# Patient Record
Sex: MALE | Race: BLACK OR AFRICAN AMERICAN | NOT HISPANIC OR LATINO | Employment: FULL TIME | URBAN - METROPOLITAN AREA
[De-identification: names, ages, dates, MRNs, and addresses within clinical notes are randomized per-mention and may not be internally consistent; named-entity substitution may affect disease eponyms.]

---

## 2018-01-31 ENCOUNTER — TRANSCRIBE ORDERS (OUTPATIENT)
Dept: PHYSICAL THERAPY | Facility: CLINIC | Age: 38
End: 2018-01-31

## 2018-01-31 DIAGNOSIS — S83.91XA SPRAIN OF RIGHT KNEE, UNSPECIFIED LIGAMENT, INITIAL ENCOUNTER: Primary | ICD-10-CM

## 2018-02-02 ENCOUNTER — TREATMENT (OUTPATIENT)
Dept: PHYSICAL THERAPY | Facility: CLINIC | Age: 38
End: 2018-02-02

## 2018-02-02 DIAGNOSIS — M25.561 ACUTE PAIN OF RIGHT KNEE: Primary | ICD-10-CM

## 2018-02-02 DIAGNOSIS — S83.91XA SPRAIN OF RIGHT KNEE, UNSPECIFIED LIGAMENT, INITIAL ENCOUNTER: ICD-10-CM

## 2018-02-02 PROCEDURE — 97110 THERAPEUTIC EXERCISES: CPT | Performed by: PHYSICAL THERAPIST

## 2018-02-02 PROCEDURE — 97530 THERAPEUTIC ACTIVITIES: CPT | Performed by: PHYSICAL THERAPIST

## 2018-02-02 PROCEDURE — 97001 PR PHYS THERAPY EVALUATION: CPT | Performed by: PHYSICAL THERAPIST

## 2018-02-05 ENCOUNTER — OFFICE VISIT (OUTPATIENT)
Dept: PHYSICAL THERAPY | Facility: CLINIC | Age: 38
End: 2018-02-05

## 2018-02-05 DIAGNOSIS — M25.561 ACUTE PAIN OF RIGHT KNEE: Primary | ICD-10-CM

## 2018-02-05 DIAGNOSIS — S83.91XD SPRAIN OF RIGHT KNEE, SUBSEQUENT ENCOUNTER: ICD-10-CM

## 2018-02-05 PROCEDURE — 97530 THERAPEUTIC ACTIVITIES: CPT | Performed by: PHYSICAL THERAPIST

## 2018-02-05 PROCEDURE — 97014 ELECTRIC STIMULATION THERAPY: CPT | Performed by: PHYSICAL THERAPIST

## 2018-02-05 PROCEDURE — 97110 THERAPEUTIC EXERCISES: CPT | Performed by: PHYSICAL THERAPIST

## 2018-02-05 NOTE — PROGRESS NOTES
Physical Therapy Daily Progress Note    Time In 1300  Time Out 1350    Rula Bardales reports: right knee feels like it is moving better since starting exercises and using heat/ice.  States that he still can't put full weight through RLE or fully extend knee without increased pain.      Subjective Evaluation    Pain  Current pain ratin  At worst pain ratin           Objective       Observations     Additional Observation Details  Ambulates with noted antalgia right LE with decreased step/stride length as well as decreased weight bearing.    Palpation     Right Tenderness of the distal semitendinosus.     Tenderness     Right Knee   Tenderness in the MCL (distal) and medial joint line.     Active Range of Motion     Right Knee   Flexion: 95 (deg) degrees with pain  Extension: 15 (deg) degrees with pain    Additional Active Range of Motion Details  Extension deficit noted as patient reports increased right knee pain with attempted full extension.     See Exercise, Manual, and Modality Logs for complete treatment.   Added: SAQ, Supine SLR with quad set, standing weightshift(side to side), seated heel/toe raises  Pain free performance of therapeutic exercise regimen.  Discussed return to using assistive device as appropriate. Ie. One crutch ambulation   Discussed continued appropriate use of knee sleeve.  Continued use of home heat/ice as appropriate.    Assessment/Plan  Presents with noted antalgia right LE with decreased step/stride length as well as weight bearing through R LE.  Palpable tenderness elicited right medial joint line, mcl and posterior hamstring(distal semitendinous).  Able to progress therapeutic exercises to include basic quad strengthening but remains limited with exercise/activity due to right knee pain.  AROM right knee flexion increased vs initial measurement, but extension deficits remain prominent.    Other  See one more time prior to MD follow up. Check goals, ROM, etc next  session.           Manual Therapy:       mins  33865;  Therapeutic Exercise:    8     mins  43029;     Neuromuscular Zoe:       mins  44539;    Therapeutic Activity:  20      mins  35784;     Gait Training:          mins  94586;     Ultrasound:        mins  99853;    Electrical Stimulation:   20    mins  06376 ( );      Timed Treatment:  28    mins   Total Treatment:    50   mins    Jimbo Story PTA    Physical Therapist Assistant [place] [id number]

## 2018-02-07 ENCOUNTER — OFFICE VISIT (OUTPATIENT)
Dept: PHYSICAL THERAPY | Facility: CLINIC | Age: 38
End: 2018-02-07

## 2018-02-07 DIAGNOSIS — M25.561 ACUTE PAIN OF RIGHT KNEE: Primary | ICD-10-CM

## 2018-02-07 DIAGNOSIS — S83.91XD SPRAIN OF RIGHT KNEE, SUBSEQUENT ENCOUNTER: ICD-10-CM

## 2018-02-07 PROCEDURE — 97014 ELECTRIC STIMULATION THERAPY: CPT | Performed by: PHYSICAL THERAPIST

## 2018-02-07 PROCEDURE — 97530 THERAPEUTIC ACTIVITIES: CPT | Performed by: PHYSICAL THERAPIST

## 2018-02-07 PROCEDURE — 97110 THERAPEUTIC EXERCISES: CPT | Performed by: PHYSICAL THERAPIST

## 2018-02-07 NOTE — PROGRESS NOTES
"Physical Therapy Progress Note    Time In 1300  Time Out 1405      2018  Farzad Goodrich PA-c    Re: Rula Bardales  ________________________________________________________________    Mr. Rula Bardales, has attended 3/3 PT sessions to his right knee.  Treatment has consisted of: therapeutic modalities, limited therapeutic exercise instruction/performance and patient education.     S: Mr. Rula Bardales states: right knee pain is increased today, unsure why maybe slept wrong on it last night, as it felt fine last night when doing exercises.  Reports that he felt that he could move his knee a little better(bending knee) after the first couple sessions of PT but that it was and is still bothersome to try and fully extend right knee(lock it out straight).  Has experienced some \"popping\" in knee with certain movements and that he continues to have constant \"throbbing\" in his knee.    Subjective Evaluation    Pain  Current pain ratin  At best pain ratin  At worst pain ratin  Quality: throbbing (popping)  Relieving factors: change in position and ice  Aggravating factors: ambulation, prolonged positioning and sleeping           Objective       Observations     Additional Observation Details  Presents with noted RLE antalgia with decreased weightbearing(~15-20% of full), step and stride length.  Noted quick step/hop with ambulation.    Palpation     Right Tenderness of the distal semitendinosus.     Tenderness     Right Knee   Tenderness in the MCL (distal), MCL (proximal) and medial joint line.     Active Range of Motion     Right Knee   Flexion: 85 degrees with pain  Extension: 10 (10 deg from 0) degrees   Extensor lag: 10 (10 deg from 0) degrees     Additional Active Range of Motion Details  AROM right knee decreased by 10 deg vs last measurement 18 which was 95 deg.    Strength/Myotome Testing     Right Knee   Quadriceps contraction: good    Swelling     Left Knee Girth Measurement (cm)   Joint line: " 45 (midpatella 45.4 cm) cm  10 cm above joint line: 50 (50.2 cm suprapatella) cm  10 cm below joint line: 43 (42.5 cm infrapatella) cm    Right Knee Girth Measurement (cm)   Joint line: 48 (47.6cm; mid patella) cm  10 cm above joint line: 50 (suprapatella 49.5cm) cm  10 cm below joint line: 44 (infrapatella 43.5 cm) cm     See Exercise, Manual, and Modality Logs for complete treatment.     Continued use/importance of ice, knee sleeve and assistive device as appropriate.      Assessment/Plan  Compliant/cooperative with short term PT intervention(3 times over last week).  Subjectively, he noted improved R knee ROM with initial exercise regimen but experiencing increased knee pain today of unknown reason/origin though suspects he slept on it wrong last night.  Objectively, he presents with limited painfree AROM Right knee, mild increase in girth measurement R vs L knee and was found to have some positive special test findings upon initial evaluation that were indicative of potential medial meniscus and MCL involvement.  Guarding today prevents assessment and limits exercise activity/participation due to pain.  Pt may benefit from continued short course of PT, though if symptoms persist he may benefit from other medical management options in the near future.    Other  To Md post Rx with letter. Await further orders.           Manual Therapy:         mins  75374;  Therapeutic Exercise:   10     mins  55773;     Neuromuscular Zoe:        mins  31570;    Therapeutic Activity:      25    mins  61861;     Gait Training:          mins  34776;     Ultrasound:          mins  02832;    Electrical Stimulation:    20    mins  59800 ( );      Timed Treatment:  35    mins   Total Treatment:    65   mins    Jimbo Story PTA,  Physical Therapist Assistant # [id number]

## 2018-04-24 ENCOUNTER — APPOINTMENT (OUTPATIENT)
Dept: PREADMISSION TESTING | Facility: HOSPITAL | Age: 38
End: 2018-04-24

## 2018-04-24 VITALS
BODY MASS INDEX: 45.1 KG/M2 | WEIGHT: 315 LBS | SYSTOLIC BLOOD PRESSURE: 196 MMHG | RESPIRATION RATE: 20 BRPM | DIASTOLIC BLOOD PRESSURE: 109 MMHG | HEIGHT: 70 IN | OXYGEN SATURATION: 96 % | TEMPERATURE: 97.9 F | HEART RATE: 93 BPM

## 2018-04-24 LAB
ANION GAP SERPL CALCULATED.3IONS-SCNC: 12.8 MMOL/L
BUN BLD-MCNC: 16 MG/DL (ref 6–20)
BUN/CREAT SERPL: 13.9 (ref 7–25)
CALCIUM SPEC-SCNC: 9.1 MG/DL (ref 8.6–10.5)
CHLORIDE SERPL-SCNC: 98 MMOL/L (ref 98–107)
CO2 SERPL-SCNC: 28.2 MMOL/L (ref 22–29)
CREAT BLD-MCNC: 1.15 MG/DL (ref 0.76–1.27)
DEPRECATED RDW RBC AUTO: 44.3 FL (ref 37–54)
ERYTHROCYTE [DISTWIDTH] IN BLOOD BY AUTOMATED COUNT: 13.5 % (ref 11.5–14.5)
GFR SERPL CREATININE-BSD FRML MDRD: 86 ML/MIN/1.73
GLUCOSE BLD-MCNC: 132 MG/DL (ref 65–99)
HCT VFR BLD AUTO: 46 % (ref 40.4–52.2)
HGB BLD-MCNC: 15.3 G/DL (ref 13.7–17.6)
MCH RBC QN AUTO: 29.7 PG (ref 27–32.7)
MCHC RBC AUTO-ENTMCNC: 33.3 G/DL (ref 32.6–36.4)
MCV RBC AUTO: 89.3 FL (ref 79.8–96.2)
PLATELET # BLD AUTO: 216 10*3/MM3 (ref 140–500)
PMV BLD AUTO: 10.9 FL (ref 6–12)
POTASSIUM BLD-SCNC: 3.4 MMOL/L (ref 3.5–5.2)
RBC # BLD AUTO: 5.15 10*6/MM3 (ref 4.6–6)
SODIUM BLD-SCNC: 139 MMOL/L (ref 136–145)
WBC NRBC COR # BLD: 7.25 10*3/MM3 (ref 4.5–10.7)

## 2018-04-24 PROCEDURE — 80048 BASIC METABOLIC PNL TOTAL CA: CPT | Performed by: ORTHOPAEDIC SURGERY

## 2018-04-24 PROCEDURE — 93010 ELECTROCARDIOGRAM REPORT: CPT | Performed by: INTERNAL MEDICINE

## 2018-04-24 PROCEDURE — 93005 ELECTROCARDIOGRAM TRACING: CPT

## 2018-04-24 PROCEDURE — 85027 COMPLETE CBC AUTOMATED: CPT | Performed by: ORTHOPAEDIC SURGERY

## 2018-04-24 PROCEDURE — 36415 COLL VENOUS BLD VENIPUNCTURE: CPT

## 2018-04-24 RX ORDER — HYDROCHLOROTHIAZIDE 25 MG/1
25 TABLET ORAL DAILY
COMMUNITY
End: 2019-05-31

## 2018-04-24 RX ORDER — METOPROLOL SUCCINATE 50 MG/1
50 TABLET, EXTENDED RELEASE ORAL DAILY
COMMUNITY
End: 2019-05-31

## 2018-04-24 RX ORDER — AMLODIPINE BESYLATE 10 MG/1
10 TABLET ORAL EVERY EVENING
COMMUNITY
End: 2019-05-31

## 2018-04-24 RX ORDER — ATORVASTATIN CALCIUM 20 MG/1
20 TABLET, FILM COATED ORAL DAILY
COMMUNITY
End: 2019-05-31

## 2018-04-24 NOTE — DISCHARGE INSTRUCTIONS
Take the following medications the morning of surgery with a small sip of water:  METOPROLOL      General Instructions:  • Do not eat solid food after midnight the night before surgery.  • You may drink clear liquids day of surgery but must stop at least one hour before your hospital arrival time.  • It is beneficial for you to have a clear drink that contains carbohydrates the day of surgery.  We suggest a 12 to 20 ounce bottle of Gatorade or Powerade for non-diabetic patients or a 12 to 20 ounce bottle of G2 or Powerade Zero for diabetic patients. (Pediatric patients, are not advised to drink a 12 to 20 ounce carbohydrate drink)    Clear liquids are liquids you can see through.  Nothing red in color.     Plain water                               Sports drinks  Sodas                                   Gelatin (Jell-O)  Fruit juices without pulp such as white grape juice and apple juice  Popsicles that contain no fruit or yogurt  Tea or coffee (no cream or milk added)  Gatorade / Powerade  G2 / Powerade Zero    • Infants may have breast milk up to four hours before surgery.  • Infants drinking formula may drink formula up to six hours before surgery.   • Patients who avoid smoking, chewing tobacco and alcohol for 4 weeks prior to surgery have a reduced risk of post-operative complications.  Quit smoking as many days before surgery as you can.  • Do not smoke, use chewing tobacco or drink alcohol the day of surgery.   • If applicable bring your C-PAP/ BI-PAP machine.  • Bring any papers given to you in the doctor’s office.  • Wear clean comfortable clothes and socks.  • Do not wear contact lenses or make-up.  Bring a case for your glasses.   • Bring crutches or walker if applicable.  • Remove all piercings.  Leave jewelry and any other valuables at home.  • Hair extensions with metal clips must be removed prior to surgery.  • The Pre-Admission Testing nurse will instruct you to bring medications if unable to obtain an  accurate list in Pre-Admission Testing.        If you were given a blood bank ID arm band remember to bring it with you the day of surgery.    Preventing a Surgical Site Infection:  • For 2 to 3 days before surgery, avoid shaving with a razor because the razor can irritate skin and make it easier to develop an infection.  • The night prior to surgery sleep in a clean bed with clean clothing.  Do not allow pets to sleep with you.  • Shower on the morning of surgery using a fresh bar of anti-bacterial soap (such as Dial) and clean washcloth.  Dry with a clean towel and dress in clean clothing.  • Ask your surgeon if you will be receiving antibiotics prior to surgery.  • Make sure you, your family, and all healthcare providers clean their hands with soap and water or an alcohol based hand  before caring for you or your wound.    Day of surgery: 5/2/2018 ARRIVAL TIME 5:30 AM  Upon arrival, a Pre-op nurse and Anesthesiologist will review your health history, obtain vital signs, and answer questions you may have.  The only belongings needed at this time will be your home medications and if applicable your C-PAP/BI-PAP machine.  If you are staying overnight your family can leave the rest of your belongings in the car and bring them to your room later.  A Pre-op nurse will start an IV and you may receive medication in preparation for surgery, including something to help you relax.  Your family will be able to see you in the Pre-op area.  While you are in surgery your family should notify the waiting room  if they leave the waiting room area and provide a contact phone number.    Please be aware that surgery does come with discomfort.  We want to make every effort to control your discomfort so please discuss any uncontrolled symptoms with your nurse.   Your doctor will most likely have prescribed pain medications.      If you are going home after surgery you will receive individualized written care  instructions before being discharged.  A responsible adult must drive you to and from the hospital on the day of your surgery and stay with you for 24 hours.    If you are staying overnight following surgery, you will be transported to your hospital room following the recovery period.  [facility] has all private rooms.    If you have any questions please call Pre-Admission Testing at [phone].  Deductibles and co-payments are collected on the day of service. Please be prepared to pay the required co-pay, deductible or deposit on the day of service as defined by your plan.

## 2018-09-25 ENCOUNTER — TRANSCRIBE ORDERS (OUTPATIENT)
Dept: PHYSICAL THERAPY | Facility: CLINIC | Age: 38
End: 2018-09-25

## 2018-09-25 DIAGNOSIS — M25.561 RIGHT KNEE PAIN, UNSPECIFIED CHRONICITY: Primary | ICD-10-CM

## 2018-10-01 ENCOUNTER — TREATMENT (OUTPATIENT)
Dept: PHYSICAL THERAPY | Facility: CLINIC | Age: 38
End: 2018-10-01

## 2018-10-01 DIAGNOSIS — M25.561 ACUTE PAIN OF RIGHT KNEE: Primary | ICD-10-CM

## 2018-10-01 PROCEDURE — 97530 THERAPEUTIC ACTIVITIES: CPT | Performed by: PHYSICAL THERAPIST

## 2018-10-01 PROCEDURE — 97014 ELECTRIC STIMULATION THERAPY: CPT | Performed by: PHYSICAL THERAPIST

## 2018-10-01 PROCEDURE — 97164 PT RE-EVAL EST PLAN CARE: CPT | Performed by: PHYSICAL THERAPIST

## 2018-10-01 PROCEDURE — 97110 THERAPEUTIC EXERCISES: CPT | Performed by: PHYSICAL THERAPIST

## 2018-10-01 NOTE — PATIENT INSTRUCTIONS
Educated about Dx and exam findings, rationale and POC. Gave handout on HEP with instructions.  Ed on basic joint protection principles and edema control

## 2018-10-01 NOTE — PROGRESS NOTES
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  Immediate onset of (R) medial knee pain.  Had progressive difficulty standing and walking.  Positive for swelling.     Patient to occ med same day.  Told to ice, use crutches as needed, use Neoprene sleeve (not wearing today), and steroid pack.  At follow-up appointment was given Ibuprofen and told to begin PT. PT x 3  - to ortho - MRI. Surgery scheduled and cancelled a couple times with Dr. Estevez so WC sent to Dr. Vital.  Surgery 18 for arthroscopic meniscectomy. RTO 10/24/18.        Patient Occupation: [employer] -    Precautions and Work Restrictions:  off work currentlyQuality of life: good    Pain  Current pain ratin  At best pain ratin  At worst pain rating: 10  Location: (R) medial knee, generalized  Quality: throbbing and sharp (sharp with quick or suddent movements)  Relieving factors: ice and rest  Aggravating factors: standing, ambulation, stairs and squatting (changing directions while walking, bending knee, straightening knee)  Progression: improved    Social Support  Patient lives at: 4 steps inside house.    Hand dominance: right    Diagnostic Tests  MRI studies: abnormal (says torn meniscus)    Treatments  Current treatment: medication and physical therapy  Current treatment comments: work activity modifications/restrictions.     Patient Goals  Patient goals for therapy: decreased pain, return to work and decreased edema             Objective       Observations     Additional Observation Details  Healing portal scars; mild-mod antalgia RLE with supination    Tenderness     Right Knee   Tenderness in the medial joint line.     Additional " Tenderness Details  Medial portal scars    Active Range of Motion   Left Knee   Flexion: WFL    Right Knee   Flexion: 113 degrees   Extensor lag: 3 degrees     Patellar Mobility     Right Knee Patellar tendons within functional limits include the lateral, superior and inferior. Hypomobile in the medial patellar tendon(s).     Strength/Myotome Testing     Left Knee   Flexion: 5  Extension: 5    Right Knee   Flexion: 4  Extension: 4-    Tests     Additional Tests Details  Neg Homans    Swelling     Left Knee Girth Measurement (cm)   Joint line: 49.6.    Right Knee Girth Measurement (cm)   Joint line: 51 cm         Assessment & Plan     Assessment  Impairments: abnormal gait, abnormal or restricted ROM, impaired physical strength and pain with function  Assessment details: 39 yo M presents 1.5 mos s/p menisectomy with limited and painful ROM, weakness, antalgic gait, edema and limited aggie for WB ADLs  Prognosis: good  Functional Limitations: walking, uncomfortable because of pain, standing, stooping and unable to perform repetitive tasks  Goals  Plan Goals: STGs x 2 wks  1. Increasing ROM and strength for improved ADLs  2. Pain with ADLs < 5/10  3. Review body mechanics and joint protection principles with ADLs  4. Ambulates level surface and 4 inch steps with min to no antalgia    LTGs x 4 wks  1. ROM and strength WFL for normal ADLs  2. Min to no tenderness and edema  3. Ambulates 5 min and flight of stairs with normal gait  4. Demonstrates tolerance for squat to lift 50 lbs and push/pull 100 lbs to allow return to work w/o restriction    Plan  Therapy options: will be seen for skilled physical therapy services  Planned modality interventions: cryotherapy and electrical stimulation/Russian stimulation  Planned therapy interventions: manual therapy, stretching, therapeutic activities, strengthening, home exercise program, body mechanics training, balance/weight-bearing training and functional ROM exercises  Duration  in visits: 12  Treatment plan discussed with: patient        Manual Therapy:    0     mins  72084;  Therapeutic Exercise:    18     mins  56746;     Neuromuscular Zoe:    0    mins  48213;    Therapeutic Activity:     8     mins  91308;       Timed Treatment:   26   mins   Total Treatment:     58   mins    PT SIGNATURE: Hoa Savage PT   DATE TREATMENT INITIATED: 10/1/2018    Initial Certification  Certification Period: 12/30/2018  I certify that the therapy services are furnished while this patient is under my care.  The services outlined above are required by this patient, and will be reviewed every 90 days.     PHYSICIAN: Olivia Douglas PA      DATE:     Please sign and return via fax to [phone].. Thank you, [facility].

## 2018-10-03 ENCOUNTER — TREATMENT (OUTPATIENT)
Dept: PHYSICAL THERAPY | Facility: CLINIC | Age: 38
End: 2018-10-03

## 2018-10-03 DIAGNOSIS — S83.91XD SPRAIN OF RIGHT KNEE, SUBSEQUENT ENCOUNTER: ICD-10-CM

## 2018-10-03 DIAGNOSIS — M25.561 ACUTE PAIN OF RIGHT KNEE: Primary | ICD-10-CM

## 2018-10-03 PROCEDURE — 97110 THERAPEUTIC EXERCISES: CPT | Performed by: PHYSICAL THERAPIST

## 2018-10-03 PROCEDURE — 97014 ELECTRIC STIMULATION THERAPY: CPT | Performed by: PHYSICAL THERAPIST

## 2018-10-05 ENCOUNTER — TREATMENT (OUTPATIENT)
Dept: PHYSICAL THERAPY | Facility: CLINIC | Age: 38
End: 2018-10-05

## 2018-10-05 DIAGNOSIS — M25.561 ACUTE PAIN OF RIGHT KNEE: Primary | ICD-10-CM

## 2018-10-05 DIAGNOSIS — S83.91XD SPRAIN OF RIGHT KNEE, SUBSEQUENT ENCOUNTER: ICD-10-CM

## 2018-10-05 PROCEDURE — 97014 ELECTRIC STIMULATION THERAPY: CPT | Performed by: PHYSICAL THERAPIST

## 2018-10-05 PROCEDURE — 97530 THERAPEUTIC ACTIVITIES: CPT | Performed by: PHYSICAL THERAPIST

## 2018-10-05 PROCEDURE — 97110 THERAPEUTIC EXERCISES: CPT | Performed by: PHYSICAL THERAPIST

## 2018-10-05 NOTE — PROGRESS NOTES
Physical Therapy Daily Progress Note  951  Visit # : 6  Rula Bardales reports: my knee is doing well; pain rated 1-2/10    Subjective     Objective   See Exercise, Manual, and Modality Logs for complete treatment.       Assessment/Plan  Excellent tolerance to step activities today with no reports of increased symptoms.  Able to perform toe/heel raises in standing.  No evidence of labored breathing during exercise program.  Add rocker board activity next visit if tolerated.   Progress strengthening /stabilization /functional activity           Manual Therapy:    -     mins  51654;  Therapeutic Exercise:    25     mins  99995;     Neuromuscular Zoe:    -    mins  13959;    Therapeutic Activity:     8     mins  18941;     Gait Training:      -     mins  42944;     Ultrasound:     -     mins  27567;    Electrical Stimulation:    15     mins  34652 ( );  Iontophoresis                 -     mins 14974      Timed Treatment:   33   mins   Total Treatment:     51   mins        Jenise Rosado PT  Physical Therapist  [place] License # [id number]

## 2018-10-09 ENCOUNTER — TREATMENT (OUTPATIENT)
Dept: PHYSICAL THERAPY | Facility: CLINIC | Age: 38
End: 2018-10-09

## 2018-10-09 DIAGNOSIS — S83.91XD SPRAIN OF RIGHT KNEE, SUBSEQUENT ENCOUNTER: ICD-10-CM

## 2018-10-09 DIAGNOSIS — M25.561 ACUTE PAIN OF RIGHT KNEE: Primary | ICD-10-CM

## 2018-10-09 PROCEDURE — 97110 THERAPEUTIC EXERCISES: CPT | Performed by: PHYSICAL THERAPIST

## 2018-10-09 PROCEDURE — 97530 THERAPEUTIC ACTIVITIES: CPT | Performed by: PHYSICAL THERAPIST

## 2018-10-09 PROCEDURE — 97014 ELECTRIC STIMULATION THERAPY: CPT | Performed by: PHYSICAL THERAPIST

## 2018-10-09 NOTE — PROGRESS NOTES
Physical Therapy Daily Progress Note      Visit # 7      Subjective   Doing pretty good.  Reported feeling unstable with new balance ex.    Objective   See Exercise, Manual, and Modality Logs for complete treatment.       Assessment/Plan    Progressing nicely with aggie for increased step ht and progression of there ex.  Good balance in sagittal plane but challenged with feelings of instability with lateral balance    Progress WB ex/activities as aggie             Manual Therapy:    0     mins  02026;  Therapeutic Exercise:    26     mins  21544;     Neuromuscular Zoe:    0    mins  13444;    Therapeutic Activity:     10     mins  46340;       Timed Treatment:   36   mins   Total Treatment:     51   mins    Hoa Savage, PT  Physical Therapist

## 2018-10-10 ENCOUNTER — TREATMENT (OUTPATIENT)
Dept: PHYSICAL THERAPY | Facility: CLINIC | Age: 38
End: 2018-10-10

## 2018-10-10 DIAGNOSIS — M25.561 ACUTE PAIN OF RIGHT KNEE: Primary | ICD-10-CM

## 2018-10-10 DIAGNOSIS — S83.91XD SPRAIN OF RIGHT KNEE, SUBSEQUENT ENCOUNTER: ICD-10-CM

## 2018-10-10 PROCEDURE — 97530 THERAPEUTIC ACTIVITIES: CPT | Performed by: PHYSICAL THERAPIST

## 2018-10-10 PROCEDURE — 97110 THERAPEUTIC EXERCISES: CPT | Performed by: PHYSICAL THERAPIST

## 2018-10-10 PROCEDURE — 97014 ELECTRIC STIMULATION THERAPY: CPT | Performed by: PHYSICAL THERAPIST

## 2018-10-10 NOTE — PROGRESS NOTES
Physical Therapy Daily Progress Note      Visit # 8      Subjective   Knee is feeling pretty good but that one balance ex hurts (lateral fitter bd)    Objective   See Exercise, Manual, and Modality Logs for complete treatment.       Assessment/Plan    Overall improvement with inc aggie for step ex and dec c/o pain but still pain with balance activity and with squat    Continue to progress strength and WB as aggie             Manual Therapy:    0     mins  48901;  Therapeutic Exercise:    26     mins  72315;     Neuromuscular Zoe:    0    mins  66614;    Therapeutic Activity:     12     mins  23505;       Timed Treatment:   38   mins   Total Treatment:     53   mins    Hoa Savage, DANIEL  Physical Therapist

## 2018-10-12 ENCOUNTER — TREATMENT (OUTPATIENT)
Dept: PHYSICAL THERAPY | Facility: CLINIC | Age: 38
End: 2018-10-12

## 2018-10-12 DIAGNOSIS — S83.91XD SPRAIN OF RIGHT KNEE, SUBSEQUENT ENCOUNTER: ICD-10-CM

## 2018-10-12 DIAGNOSIS — M25.561 ACUTE PAIN OF RIGHT KNEE: Primary | ICD-10-CM

## 2018-10-12 PROCEDURE — 97110 THERAPEUTIC EXERCISES: CPT | Performed by: PHYSICAL THERAPIST

## 2018-10-12 PROCEDURE — 97014 ELECTRIC STIMULATION THERAPY: CPT | Performed by: PHYSICAL THERAPIST

## 2018-10-12 PROCEDURE — 97530 THERAPEUTIC ACTIVITIES: CPT | Performed by: PHYSICAL THERAPIST

## 2018-10-12 NOTE — PROGRESS NOTES
Physical Therapy Daily Progress Note      Visit # 9      Subjective   Feel pretty good.  That one balance activity hurt last time (lateral)    Objective   See Exercise, Manual, and Modality Logs for complete treatment.       Assessment/Plan    Overall improving with aggie for progression of ex including inc step ht and functional squat.  Dec c/o pain with removal of lateral Fitter Bd balance    Progress functional activities as aggie             Manual Therapy:    0     mins  21259;  Therapeutic Exercise:    30     mins  72386;     Neuromuscular Zoe:    0    mins  93596;    Therapeutic Activity:     12     mins  82135;       Timed Treatment:   42   mins   Total Treatment:     57   mins    Hoa Savage, DANIEL  Physical Therapist

## 2018-10-16 ENCOUNTER — TREATMENT (OUTPATIENT)
Dept: PHYSICAL THERAPY | Facility: CLINIC | Age: 38
End: 2018-10-16

## 2018-10-16 DIAGNOSIS — S83.91XD SPRAIN OF RIGHT KNEE, SUBSEQUENT ENCOUNTER: ICD-10-CM

## 2018-10-16 DIAGNOSIS — M25.561 ACUTE PAIN OF RIGHT KNEE: Primary | ICD-10-CM

## 2018-10-16 PROCEDURE — 97014 ELECTRIC STIMULATION THERAPY: CPT | Performed by: PHYSICAL THERAPIST

## 2018-10-16 PROCEDURE — 97530 THERAPEUTIC ACTIVITIES: CPT | Performed by: PHYSICAL THERAPIST

## 2018-10-16 PROCEDURE — 97110 THERAPEUTIC EXERCISES: CPT | Performed by: PHYSICAL THERAPIST

## 2018-10-16 NOTE — PROGRESS NOTES
Physical Therapy Daily Progress Note      Visit # 10      Subjective   Knee swelled up on me this weekend from going up/down steps a lot    Objective   See Exercise, Manual, and Modality Logs for complete treatment.       Assessment/Plan    Tolerated light progression of ex/activities despite being flared-up from repetitive steps this weekend    Continue to progress functional activities as aggie             Manual Therapy:    0     mins  74914;  Therapeutic Exercise:    28     mins  99404;     Neuromuscular Zoe:    0    mins  43511;    Therapeutic Activity:     13     mins  08169;         Timed Treatment:   41   mins   Total Treatment:     56   mins    Hoa Savage PT  Physical Therapist

## 2018-10-17 ENCOUNTER — TREATMENT (OUTPATIENT)
Dept: PHYSICAL THERAPY | Facility: CLINIC | Age: 38
End: 2018-10-17

## 2018-10-17 DIAGNOSIS — M25.561 ACUTE PAIN OF RIGHT KNEE: Primary | ICD-10-CM

## 2018-10-17 DIAGNOSIS — S83.91XD SPRAIN OF RIGHT KNEE, SUBSEQUENT ENCOUNTER: ICD-10-CM

## 2018-10-17 PROCEDURE — 97110 THERAPEUTIC EXERCISES: CPT | Performed by: PHYSICAL THERAPIST

## 2018-10-17 PROCEDURE — 97014 ELECTRIC STIMULATION THERAPY: CPT | Performed by: PHYSICAL THERAPIST

## 2018-10-17 PROCEDURE — 97530 THERAPEUTIC ACTIVITIES: CPT | Performed by: PHYSICAL THERAPIST

## 2018-10-17 NOTE — PROGRESS NOTES
Physical Therapy Daily Progress Note      Visit # 11      Subjective   It's sore today - didn't doing anything extra yesterday except at therapy    Objective   See Exercise, Manual, and Modality Logs for complete treatment.       Assessment/Plan    Mildly flared-up from increased ex/activities last visit but no reported increase in overall pain with activities today and was able to perform lat step ex at higher level    Continue to progress functional activities as aggie             Manual Therapy:    0     mins  45952;  Therapeutic Exercise:    28     mins  90317;     Neuromuscular Zoe:    0    mins  68590;    Therapeutic Activity:     12     mins  81836;       Timed Treatment:   40   mins   Total Treatment:     55   mins    Hoa Savage PT  Physical Therapist

## 2018-10-19 ENCOUNTER — TREATMENT (OUTPATIENT)
Dept: PHYSICAL THERAPY | Facility: CLINIC | Age: 38
End: 2018-10-19

## 2018-10-19 DIAGNOSIS — S83.91XD SPRAIN OF RIGHT KNEE, SUBSEQUENT ENCOUNTER: ICD-10-CM

## 2018-10-19 DIAGNOSIS — M25.561 ACUTE PAIN OF RIGHT KNEE: Primary | ICD-10-CM

## 2018-10-19 PROCEDURE — 97530 THERAPEUTIC ACTIVITIES: CPT | Performed by: PHYSICAL THERAPIST

## 2018-10-19 PROCEDURE — 97110 THERAPEUTIC EXERCISES: CPT | Performed by: PHYSICAL THERAPIST

## 2018-10-19 PROCEDURE — 97014 ELECTRIC STIMULATION THERAPY: CPT | Performed by: PHYSICAL THERAPIST

## 2018-10-19 NOTE — PROGRESS NOTES
Physical Therapy Daily Progress Note      Visit # 12      Subjective   Knee swells some with activity at times but then goes down with rest    Objective   See Exercise, Manual, and Modality Logs for complete treatment.       Assessment/Plan    Still with some pain and reactive edema but overall improved as able to progress ex/activities gradually    Continue to progress functional activities - push/pull moncho             Manual Therapy:    0     mins  97277;  Therapeutic Exercise:    29     mins  15059;     Neuromuscular Zoe:    0    mins  54399;    Therapeutic Activity:     13     mins  55282;       Timed Treatment:   42   mins   Total Treatment:     57   mins    Hoa Savage PT  Physical Therapist

## 2018-10-23 ENCOUNTER — TREATMENT (OUTPATIENT)
Dept: PHYSICAL THERAPY | Facility: CLINIC | Age: 38
End: 2018-10-23

## 2018-10-23 DIAGNOSIS — M25.561 ACUTE PAIN OF RIGHT KNEE: Primary | ICD-10-CM

## 2018-10-23 PROCEDURE — 97110 THERAPEUTIC EXERCISES: CPT | Performed by: PHYSICAL THERAPIST

## 2018-10-23 PROCEDURE — 97014 ELECTRIC STIMULATION THERAPY: CPT | Performed by: PHYSICAL THERAPIST

## 2018-10-23 PROCEDURE — 97530 THERAPEUTIC ACTIVITIES: CPT | Performed by: PHYSICAL THERAPIST

## 2018-10-23 NOTE — PROGRESS NOTES
Physical Therapy  Progress Note        10/23/2018  Olivia Douglas PA    Re: Rula Bardales  ________________________________________________________________    Mr. Rula Bardales, has attended 10/12 PT sessions.  Treatment has consisted of: gradually progressive there-ex/act, HEP, modalities, pt education     S: Mr. Rula Bardales states: not swelling or as much pain as much as it used to but still get occasional sharp pains especially during sleep.  Some popping but no giving way.      Subjective     Objective       Observations     Additional Observation Details  Healing portal scars; mild antalgia RLE     Tenderness     Right Knee   Tenderness in the medial joint line.     Active Range of Motion   Left Knee   Flexion: WFL    Right Knee   Flexion: 120 degrees   Extension: 0 degrees with pain    Additional Active Range of Motion Details  ~ equal kimberli    Patellar Mobility     Right Knee Patellar tendons within functional limits include the medial, lateral, superior and inferior.     Additional Patellar Mobility Details  + crepitus    Strength/Myotome Testing     Left Knee   Flexion: 5  Extension: 5    Right Knee   Flexion: 4+  Extension: 4    Swelling     Left Knee Girth Measurement (cm)   Joint line: 49.6.    Right Knee Girth Measurement (cm)   Joint line: 50 cm    Functional Assessment     Comments  Tolerates squat to lift up to 50 lbs to waist with minimal pain but mod pain at 60 lbs; tolerates push sled with added 100 lbs but slight strain with pull     See Exercise, Manual, and Modality Logs for complete treatment.       Assessment/Plan    Overall improved with increased ROM and strength and decreased pain and edema but still with weakness, MJL tenderness and pain with prolonged WB activities.  Could benefit from continued PT to progress towards safe RTW.    To MD             Manual Therapy:    0     mins  21766;  Therapeutic Exercise:    28     mins  37493;     Neuromuscular Zoe:    0    mins  82693;     Therapeutic Activity:     16     mins  80350;       Timed Treatment:   44   mins   Total Treatment:     59   mins    Hoa Savage PT  Physical Therapist

## 2018-10-29 ENCOUNTER — TREATMENT (OUTPATIENT)
Dept: PHYSICAL THERAPY | Facility: CLINIC | Age: 38
End: 2018-10-29

## 2018-10-29 DIAGNOSIS — M25.561 ACUTE PAIN OF RIGHT KNEE: Primary | ICD-10-CM

## 2018-10-29 DIAGNOSIS — S83.91XD SPRAIN OF RIGHT KNEE, SUBSEQUENT ENCOUNTER: ICD-10-CM

## 2018-10-29 PROCEDURE — 97545 WORK HARDENING: CPT | Performed by: PHYSICAL THERAPIST

## 2018-10-29 PROCEDURE — 97164 PT RE-EVAL EST PLAN CARE: CPT | Performed by: PHYSICAL THERAPIST

## 2018-10-29 NOTE — PROGRESS NOTES
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  Immediate onset of (R) medial knee pain.  Had progressive difficulty standing and walking.  Positive for swelling.     Patient to occ med same day.  Told to ice, use crutches as needed, use Neoprene sleeve (not wearing today), and steroid pack.  At follow-up appointment was given Ibuprofen and told to begin PT. PT x 3  - to ortho - MRI. Surgery scheduled and cancelled a couple times with Dr. Estevez so WC sent to Dr. Vital.  Surgery 18 for arthroscopic meniscectomy. PT x 10.  Now referral back for work conditioning/hardening.        Patient Occupation: [employer] -    Precautions and Work Restrictions:  off work currentlyQuality of life: good    Pain  Current pain ratin  At best pain ratin  At worst pain ratin  Location: (R) medial knee, generalized  Quality: sharp and radiating (sharp with quick or suddent movements)  Relieving factors: ice and rest  Aggravating factors: standing, ambulation, stairs and squatting (changing directions while walking, bending knee, straightening knee)  Progression: improved    Social Support  Lives in: one-story house (4 steps inside house)    Hand dominance: right    Diagnostic Tests  MRI studies: abnormal (says torn meniscus)    Treatments  Previous treatment: physical therapy  Current treatment: medication and physical therapy  Current treatment comments: work activity modifications/restrictions.     Patient Goals  Patient goals for therapy: decreased pain and return to work             Objective       Observations     Additional Observation Details  Healing portal scars; mild antalgia RLE     Tenderness     Right Knee "   Tenderness in the medial joint line.     Active Range of Motion   Left Knee   Flexion: WFL    Right Knee   Flexion: 120 degrees   Extension: 0 degrees with pain    Additional Active Range of Motion Details  C-spine and UEs all WFL  All lumbar WFL; R hip flexion limited due to knee pain - other hip WFL kimberli  ~ equal kimberli    Patellar Mobility     Right Knee Patellar tendons within functional limits include the medial, lateral, superior and inferior.     Additional Patellar Mobility Details  + crepitus    Strength/Myotome Testing     Left Hip   Planes of Motion   Flexion: 5  Abduction: 5  Adduction: 5    Right Hip   Planes of Motion   Flexion: 5  Extension: 5  Adduction: 5    Left Knee   Flexion: 5  Extension: 5    Right Knee   Flexion: 4+  Extension: 4    Left Ankle/Foot   Dorsiflexion: 5  Plantar flexion: 5    Right Ankle/Foot   Dorsiflexion: 5  Right ankle plantar flexion strength: + toe walk due to knee pain.    Additional Strength Details  kimberli UEs 5/5    Swelling     Left Knee Girth Measurement (cm)   Joint line: 49.6.    Right Knee Girth Measurement (cm)   Joint line: 50 cm         Assessment & Plan     Assessment  Impairments: activity intolerance, impaired physical strength and pain with function  Assessment details: 39 yo M presents for progression of PT to work conditioning/hardening to prepare for safe and tolerable RTW.  Still with mod-severe pain at times and some weakness but overall improved. Limited aggie for repetitive/prolonged WB activities required by job.  Prognosis: good  Functional Limitations: carrying objects, walking, uncomfortable because of pain and unable to perform repetitive tasks  Goals  Plan Goals: STGs x 2 wk  1. Increasing strength for improved ADLs  2. Tolerates > 90 min work conditioning activities with min to no inc in pain  3. Demonstrates proper body mechanics with actiivities  4. Tolerates squat/lift 70 lbs, push/pull 100 lbs and carry 40 lbs    LTGs x 4 wks  1. ROM and strength  WFL for normal ADLs  2. No reactive edema with prolonged activities  3. Ambulates 10 min and flight of stairs with normal gait  4. Demonstrates tolerance for 2 hrs work simulation activities to allow return to work w/o restriction    Plan  Therapy options: will be seen for skilled physical therapy services  Planned modality interventions: cryotherapy  Planned therapy interventions: therapeutic activities, stretching, strengthening, body mechanics training and abdominal trunk stabilization (work conditionin/hardening)  Frequency: 3x week  Duration in weeks: 4  Treatment plan discussed with: patient          Work Hardening           60      mins [id number]      Timed Treatment:   60   mins   Total Treatment:     90   mins    PT SIGNATURE: Hoa Savage PT   DATE TREATMENT INITIATED: 10/29/2018    Initial Certification  Certification Period: 1/27/2019  I certify that the therapy services are furnished while this patient is under my care.  The services outlined above are required by this patient, and will be reviewed every 90 days.     PHYSICIAN: Olivia Douglas PA      DATE:     Please sign and return via fax to [phone].. Thank you, [facility].

## 2018-10-29 NOTE — PATIENT INSTRUCTIONS
Educated about Dx and exam findings, rationale and POC. Gave handout on HEP with instructions.  About work conditioning program

## 2018-10-31 ENCOUNTER — TREATMENT (OUTPATIENT)
Dept: PHYSICAL THERAPY | Facility: CLINIC | Age: 38
End: 2018-10-31

## 2018-10-31 DIAGNOSIS — M25.561 ACUTE PAIN OF RIGHT KNEE: Primary | ICD-10-CM

## 2018-10-31 PROCEDURE — 97545 WORK HARDENING: CPT | Performed by: PHYSICAL THERAPIST

## 2018-10-31 NOTE — PROGRESS NOTES
Physical Therapy Daily Progress Note      Visit # 15      Subjective   Knee hurt after first WH session but it didn't last    Objective   See Exercise, Manual, and Modality Logs for complete treatment.       Assessment/Plan    Some pain with repetitive/prolonged activities but no lasting from so far.  Was able to progress ex moderately.    Continue to progress work simulation activities as aggie               Work Hardening           95      mins [id number]      Timed Treatment:   95   mins   Total Treatment:     110   mins    Hoa Savage PT  Physical Therapist

## 2018-11-02 ENCOUNTER — TREATMENT (OUTPATIENT)
Dept: PHYSICAL THERAPY | Facility: CLINIC | Age: 38
End: 2018-11-02

## 2018-11-02 DIAGNOSIS — M25.561 ACUTE PAIN OF RIGHT KNEE: Primary | ICD-10-CM

## 2018-11-02 PROCEDURE — 97545 WORK HARDENING: CPT | Performed by: PHYSICAL THERAPIST

## 2018-11-02 NOTE — PROGRESS NOTES
Physical Therapy Daily Progress Note      Visit # 16      Subjective   Fatigued after last session    Objective   See Exercise, Manual, and Modality Logs for complete treatment.       Assessment/Plan    Tolerating current work conditioning from a pain standpoint but fatigues easily still.    Continue with gradual progression of work sim activities                 Work Hardening           100      mins [id number]      Timed Treatment:   100   mins   Total Treatment:     115   mins    Hoa Savage PT  Physical Therapist

## 2018-11-06 ENCOUNTER — TREATMENT (OUTPATIENT)
Dept: PHYSICAL THERAPY | Facility: CLINIC | Age: 38
End: 2018-11-06

## 2018-11-06 DIAGNOSIS — M25.561 ACUTE PAIN OF RIGHT KNEE: Primary | ICD-10-CM

## 2018-11-06 PROCEDURE — 97545 WORK HARDENING: CPT | Performed by: PHYSICAL THERAPIST

## 2018-11-06 NOTE — PROGRESS NOTES
Physical Therapy Daily Progress Note      Visit # 17      Subjective   Tired but doing okay    Objective   See Exercise, Manual, and Modality Logs for complete treatment.       Assessment/Plan    Moderately fatigued with session today - needing more rest breaks but kellie to complete all daily goals    Continue to progress work sim activities as aggie               Work Hardening           115      mins [id number]    Timed Treatment:   115   mins   Total Treatment:     125   mins    Hoa Savage, PT  Physical Therapist

## 2018-11-08 ENCOUNTER — TREATMENT (OUTPATIENT)
Dept: PHYSICAL THERAPY | Facility: CLINIC | Age: 38
End: 2018-11-08

## 2018-11-08 DIAGNOSIS — M25.561 ACUTE PAIN OF RIGHT KNEE: Primary | ICD-10-CM

## 2018-11-08 PROCEDURE — 97545 WORK HARDENING: CPT | Performed by: PHYSICAL THERAPIST

## 2018-11-08 NOTE — PROGRESS NOTES
Physical Therapy Daily Progress Note      Visit # 18      Subjective   Knee doing ok except it's been popping    Objective   See Exercise, Manual, and Modality Logs for complete treatment.       Assessment/Plan    Able to complete all daily goals indicating continued improvement towards preparation for RTW    Progress work sim as aggie               Work Hardening           115      mins [id number]      Timed Treatment:   115   mins   Total Treatment:     125   mins    Hoa Savage, PT  Physical Therapist

## 2018-11-09 ENCOUNTER — TREATMENT (OUTPATIENT)
Dept: PHYSICAL THERAPY | Facility: CLINIC | Age: 38
End: 2018-11-09

## 2018-11-09 DIAGNOSIS — M25.561 ACUTE PAIN OF RIGHT KNEE: Primary | ICD-10-CM

## 2018-11-09 PROCEDURE — 97545 WORK HARDENING: CPT | Performed by: PHYSICAL THERAPIST

## 2018-11-12 ENCOUNTER — TREATMENT (OUTPATIENT)
Dept: PHYSICAL THERAPY | Facility: CLINIC | Age: 38
End: 2018-11-12

## 2018-11-12 DIAGNOSIS — M25.561 ACUTE PAIN OF RIGHT KNEE: Primary | ICD-10-CM

## 2018-11-12 PROCEDURE — 97545 WORK HARDENING: CPT | Performed by: PHYSICAL THERAPIST

## 2018-11-12 NOTE — PROGRESS NOTES
Physical Therapy Daily Progress Note      Visit # 20      Subjective   Knee hurting a little and wants to give out when I get up from sitting awhile.    Objective   See Exercise, Manual, and Modality Logs for complete treatment.       Assessment/Plan    Completed all daily goals without c/o increased pain.  Still though with subjective reports of near giving way with ADLs at home.    Progress strength/stability and work sim as aggie               Work Hardening           104      mins [id number]      Timed Treatment:   104   mins   Total Treatment:     114   mins    Hoa Savage, PT  Physical Therapist

## 2018-11-15 ENCOUNTER — TREATMENT (OUTPATIENT)
Dept: PHYSICAL THERAPY | Facility: CLINIC | Age: 38
End: 2018-11-15

## 2018-11-15 DIAGNOSIS — M25.561 ACUTE PAIN OF RIGHT KNEE: Primary | ICD-10-CM

## 2018-11-15 PROCEDURE — 97545 WORK HARDENING: CPT | Performed by: PHYSICAL THERAPIST

## 2018-11-15 NOTE — PROGRESS NOTES
Physical Therapy Daily Progress Note      Visit # 21      Subjective   Knee not bothering me this week so far    Objective   See Exercise, Manual, and Modality Logs for complete treatment.       Assessment/Plan    Adapting well to increasing work sim activities with reports of dec pain    Continue to progress reps on lifting                Work Hardening           109      mins [id number]      Timed Treatment:   109   mins   Total Treatment:     119   mins    Hoa Savage, PT  Physical Therapist

## 2018-11-16 ENCOUNTER — TREATMENT (OUTPATIENT)
Dept: PHYSICAL THERAPY | Facility: CLINIC | Age: 38
End: 2018-11-16

## 2018-11-16 DIAGNOSIS — M25.561 ACUTE PAIN OF RIGHT KNEE: Primary | ICD-10-CM

## 2018-11-16 PROCEDURE — 97545 WORK HARDENING: CPT | Performed by: PHYSICAL THERAPIST

## 2018-11-16 NOTE — PROGRESS NOTES
Physical Therapy Daily Progress Note      Visit # 22      Subjective   Knee hurt some last night but it went away    Objective   See Exercise, Manual, and Modality Logs for complete treatment.       Assessment/Plan    Fatigued with increased reps on work simulation but no notable inc in pain    Continue to progress reps as aggie               Work Hardening           102      mins [id number]      Timed Treatment:   102   mins   Total Treatment:     112   mins    Hoa Savage, PT  Physical Therapist

## 2018-11-20 ENCOUNTER — TREATMENT (OUTPATIENT)
Dept: PHYSICAL THERAPY | Facility: CLINIC | Age: 38
End: 2018-11-20

## 2018-11-20 DIAGNOSIS — M25.561 ACUTE PAIN OF RIGHT KNEE: Primary | ICD-10-CM

## 2018-11-20 PROCEDURE — 97545 WORK HARDENING: CPT | Performed by: PHYSICAL THERAPIST

## 2018-11-20 NOTE — PROGRESS NOTES
Physical Therapy Daily Progress Note      Visit # 23      Subjective   Knee started popping with sharp pains while walking into store on Saturday.  Had a couple episodes of near giving way.  Not as bad today but still pops and hurts at times.    Objective       Tenderness     Right Knee   Tenderness in the medial joint line and quadriceps tendon.     Active Range of Motion   Left Knee   Flexion: 130 degrees     Right Knee   Flexion: 108 degrees   Extension: 0 degrees     Strength/Myotome Testing     Left Knee   Flexion: 5  Extension: 5    Right Knee   Flexion: 4-  Extension: 4    Tests     Additional Tests Details  Pain with valgus stress but no obvious laxity; pain and guarding with Medial Evelyn     See Exercise, Manual, and Modality Logs for complete treatment.       Assessment/Plan    Flared-up from popping and near giving way over the weekend.  ROM and strength decreased with pain.  Signs/sxs possible MMT but not obvious.  Ex and work sim limited today to as tolerated.    Resume activities as aggie             Work Hardening           55      mins [id number]      Timed Treatment:   55   mins   Total Treatment:     75   mins    Hoa Savage PT  Physical Therapist

## 2018-11-26 ENCOUNTER — TREATMENT (OUTPATIENT)
Dept: PHYSICAL THERAPY | Facility: CLINIC | Age: 38
End: 2018-11-26

## 2018-11-26 DIAGNOSIS — M25.561 ACUTE PAIN OF RIGHT KNEE: Primary | ICD-10-CM

## 2018-11-26 PROCEDURE — 97530 THERAPEUTIC ACTIVITIES: CPT | Performed by: PHYSICAL THERAPIST

## 2018-11-26 PROCEDURE — 97110 THERAPEUTIC EXERCISES: CPT | Performed by: PHYSICAL THERAPIST

## 2018-11-26 NOTE — PROGRESS NOTES
Physical Therapy  Progress Note          11/26/2018  Olivia Douglas PA    Re: Rula Bardales  ________________________________________________________________    Mr. Rula Bardales, has attended 11/12 work conditioning sessions.  Treatment has consisted of: there-ex for flexibility and strengthening and progressive work simulation activities.     S: Mr. Rula Bardales states: feel like I strained my knee last week but not sure how.  Just started hurting while walking through [employer]. It's been popping and catching and juanita first thing in the morning sometimes.      Subjective     Objective       Observations     Additional Observation Details  Mild limp with dec stance time RLE    Tenderness     Additional Tenderness Details  No notable TTP    Active Range of Motion   Left Knee   Normal active range of motion    Right Knee   Flexion: 120 degrees with pain  Extension: 0 degrees with pain    Strength/Myotome Testing     Left Knee   Flexion: 5  Extension: 5    Right Knee   Flexion: 4  Extension: 4    Tests     Right Knee   Positive valgus stress test at 30 degrees.   Negative anterior drawer, posterior sag and varus stress test at 30 degrees.     Additional Tests Details  Unable to fully assess medial Evelyn due to pain and guarding     See Exercise, Manual, and Modality Logs for complete treatment.       Assessment/Plan    Was initially progressing well with work conditioning - was able to squat/lift up to 70 lbs, walk on treadmill for 10 min, and carry 10 lbs up/down 8 inch steps. Experienced a flare-up last week walking for ADLs outside of PT without a specific twisting injury reported.  Is now presenting with signs/sxs of possible medial knee internal derangement.    To MD         Manual Therapy:    0     mins  03697;  Therapeutic Exercise:    25     mins  [id number];     Neuromuscular Zoe:    0    mins  20938;    Therapeutic Activity:     10     mins  67727;       Timed Treatment:   35   mins   Total Treatment:      45   mins    Hoa Savage, PT  Physical Therapist

## 2019-05-31 PROBLEM — E66.9 DIABETES MELLITUS TYPE 2 IN OBESE (HCC): Status: ACTIVE | Noted: 2019-05-31

## 2019-05-31 PROBLEM — E11.69 DIABETES MELLITUS TYPE 2 IN OBESE (HCC): Status: ACTIVE | Noted: 2019-05-31

## 2019-05-31 PROBLEM — R53.82 CHRONIC FATIGUE: Status: ACTIVE | Noted: 2019-05-31

## 2019-05-31 PROBLEM — R06.83 SNORING: Status: ACTIVE | Noted: 2019-05-31

## 2019-05-31 PROBLEM — M25.50 MULTIPLE JOINT PAIN: Status: ACTIVE | Noted: 2019-05-31

## 2019-05-31 PROBLEM — G47.33 OSA (OBSTRUCTIVE SLEEP APNEA): Status: ACTIVE | Noted: 2019-05-31

## 2019-06-06 DIAGNOSIS — R53.82 CHRONIC FATIGUE: ICD-10-CM

## 2019-06-06 DIAGNOSIS — E66.9 DIABETES MELLITUS TYPE 2 IN OBESE (HCC): ICD-10-CM

## 2019-06-06 DIAGNOSIS — M25.50 MULTIPLE JOINT PAIN: ICD-10-CM

## 2019-06-06 DIAGNOSIS — G47.33 OSA (OBSTRUCTIVE SLEEP APNEA): ICD-10-CM

## 2019-06-06 DIAGNOSIS — E11.69 DIABETES MELLITUS TYPE 2 IN OBESE (HCC): ICD-10-CM

## 2019-06-11 ENCOUNTER — APPOINTMENT (OUTPATIENT)
Dept: LAB | Facility: HOSPITAL | Age: 39
End: 2019-06-11

## 2019-07-24 NOTE — PROGRESS NOTES
Physical Therapy Daily Progress Note      Visit # 19      Subjective   Knee was fine after yesterday - hurt a little but ok after rested    Objective   See Exercise, Manual, and Modality Logs for complete treatment.       Assessment/Plan    Continues with only temporary increase in pain with work conditioning activities.   Able to progress lifting challenge and reps    Continue to progress work sim as aggie               Work Hardening           112      mins 51756      Timed Treatment:   112   mins   Total Treatment:     112   mins    Hoa Savage PT  Physical Therapist   O-Z Plasty Text: The defect edges were debeveled with a #15 scalpel blade.  Given the location of the defect, shape of the defect and the proximity to free margins an O-Z plasty (double transposition flap) was deemed most appropriate.  Using a sterile surgical marker, the appropriate transposition flaps were drawn incorporating the defect and placing the expected incisions within the relaxed skin tension lines where possible.    The area thus outlined was incised deep to adipose tissue with a #15 scalpel blade.  The skin margins were undermined to an appropriate distance in all directions utilizing iris scissors.  Hemostasis was achieved with electrocautery.  The flaps were then transposed into place, one clockwise and the other counterclockwise, and anchored with interrupted buried subcutaneous sutures.

## 2021-03-31 PROBLEM — I10 ESSENTIAL HYPERTENSION: Status: ACTIVE | Noted: 2021-03-31

## 2021-04-20 ENCOUNTER — CONSULT (OUTPATIENT)
Dept: BARIATRICS/WEIGHT MGMT | Facility: CLINIC | Age: 41
End: 2021-04-20

## 2021-04-20 VITALS
TEMPERATURE: 97.3 F | BODY MASS INDEX: 46.65 KG/M2 | HEIGHT: 69 IN | SYSTOLIC BLOOD PRESSURE: 185 MMHG | DIASTOLIC BLOOD PRESSURE: 116 MMHG | HEART RATE: 111 BPM | WEIGHT: 315 LBS | RESPIRATION RATE: 18 BRPM

## 2021-04-20 DIAGNOSIS — R53.82 CHRONIC FATIGUE: ICD-10-CM

## 2021-04-20 DIAGNOSIS — E66.9 DIABETES MELLITUS TYPE 2 IN OBESE (HCC): ICD-10-CM

## 2021-04-20 DIAGNOSIS — I10 ESSENTIAL HYPERTENSION: ICD-10-CM

## 2021-04-20 DIAGNOSIS — M25.50 MULTIPLE JOINT PAIN: ICD-10-CM

## 2021-04-20 DIAGNOSIS — J96.01 ACUTE RESPIRATORY FAILURE WITH HYPOXIA (HCC): ICD-10-CM

## 2021-04-20 DIAGNOSIS — G47.33 OSA (OBSTRUCTIVE SLEEP APNEA): ICD-10-CM

## 2021-04-20 DIAGNOSIS — R06.02 SHORTNESS OF BREATH: ICD-10-CM

## 2021-04-20 DIAGNOSIS — E11.69 DIABETES MELLITUS TYPE 2 IN OBESE (HCC): ICD-10-CM

## 2021-04-20 PROBLEM — I95.0 IDIOPATHIC HYPOTENSION: Status: ACTIVE | Noted: 2021-02-06

## 2021-04-20 PROBLEM — E11.9 DIABETES MELLITUS (HCC): Status: RESOLVED | Noted: 2019-04-22 | Resolved: 2021-04-20

## 2021-04-20 PROBLEM — E11.9 DIABETES MELLITUS (HCC): Status: ACTIVE | Noted: 2019-04-22

## 2021-04-20 PROBLEM — N17.9 ACUTE RENAL FAILURE: Status: ACTIVE | Noted: 2021-01-27

## 2021-04-20 PROCEDURE — 99205 OFFICE O/P NEW HI 60 MIN: CPT | Performed by: NURSE PRACTITIONER

## 2021-04-20 RX ORDER — LANOLIN ALCOHOL/MO/W.PET/CERES
3 CREAM (GRAM) TOPICAL DAILY
COMMUNITY
Start: 2021-03-29 | End: 2021-04-20

## 2021-04-20 RX ORDER — VALSARTAN 40 MG/1
40 TABLET ORAL DAILY
COMMUNITY
End: 2021-07-26

## 2021-04-20 RX ORDER — AMLODIPINE BESYLATE 10 MG/1
10 TABLET ORAL DAILY
COMMUNITY
End: 2021-04-20

## 2021-04-20 RX ORDER — POTASSIUM CHLORIDE 1500 MG/1
20 TABLET, FILM COATED, EXTENDED RELEASE ORAL DAILY
COMMUNITY
Start: 2021-03-13 | End: 2021-07-26

## 2021-04-20 RX ORDER — MELATONIN
1000 DAILY
COMMUNITY
Start: 2021-03-29 | End: 2021-04-20

## 2021-04-20 RX ORDER — AMLODIPINE BESYLATE AND ATORVASTATIN CALCIUM 10; 40 MG/1; MG/1
1 TABLET, FILM COATED ORAL DAILY
COMMUNITY
Start: 2021-03-29 | End: 2021-04-20

## 2021-04-20 RX ORDER — CITALOPRAM 10 MG/1
1 TABLET ORAL DAILY
COMMUNITY
Start: 2021-01-11 | End: 2021-07-26

## 2021-04-20 RX ORDER — IPRATROPIUM/ALBUTEROL SULFATE 20-100 MCG
1 MIST INHALER (GRAM) INHALATION DAILY
COMMUNITY
Start: 2021-03-13 | End: 2021-07-26

## 2021-04-20 RX ORDER — ASCORBIC ACID 500 MG
500 TABLET ORAL EVERY 12 HOURS
COMMUNITY
Start: 2021-03-13 | End: 2021-04-20

## 2021-04-20 RX ORDER — FUROSEMIDE 40 MG/1
40 TABLET ORAL EVERY 12 HOURS
COMMUNITY
Start: 2021-03-13 | End: 2021-04-20

## 2021-04-20 RX ORDER — ALBUTEROL SULFATE 90 UG/1
2 AEROSOL, METERED RESPIRATORY (INHALATION) 4 TIMES DAILY
COMMUNITY
Start: 2021-01-25 | End: 2021-11-18

## 2021-04-20 NOTE — PROGRESS NOTES
[facility] BARIATRIC [facility] BARIATRIC SURGERY  [address] [facility] [address]  [facility] [address]  [phone]  [address] [facility] [address]  [facility] [id number] [phone]  Dept: [phone]  4/20/2021      Rula Bardales.  [id number]  [id number]  1980  male      Chief Complaint of weight gain; unable to maintain weight loss    History of Present Illness:   Rula is a 40 y.o. male who presents today for evaluation, education and consultation regarding weight loss surgery. The patient is interested in the sleeve gastrectomy.      Diet History:Rula has been overweight for at least 15 years, has been 35 pounds or more overweight for at least 15 years, has been 100 pounds or more overweight for 15 or more years and started dieting at age 38.  The most weight Rula lost was 15 pounds on reduced calorie diet and exercise and maintained the weight loss for 5 months. Rula describes his eating habits as snacking without portion control, overeating at mealtimes, drinking sodas, eating to cope with boredom. Rula Bardales has tried reduced calorie and exercising among others with success of losing up to 15 pounds, but in each instance regained the weight.     See dietician documentation for complete history.    Patient did test positive for Covid-19 and developed acute respiratory and renal failure. He now suffers from SOA on exertion, and sometimes at rest, ongoing fatigue, and memory impairment.    Bariatric Surgery Evaluation: The patient is being seen for an initial visit for bariatric surgery evaluation and education.     Bariatric Co-morbidities:  sleep apnea, hypertension, back pain and knee pain    Patient Active Problem List   Diagnosis   • Chronic fatigue   • AMIRA (obstructive sleep apnea)   • Snoring   • Multiple joint pain   • Diabetes mellitus type 2 in obese (CMS/HCC)   • Body mass index (BMI) of 50-59.9 in adult (CMS/HCC)   • Essential hypertension   • Idiopathic  hypotension   • Acute renal failure (CMS/HCC)   • Acute respiratory failure with hypoxia (CMS/HCC)   • Shortness of breath       Past Medical History:   Diagnosis Date   • Arthritis    • Diabetes mellitus (CMS/HCC)    • Hyperlipidemia    • Hypertension    • Right knee pain        Past Surgical History:   Procedure Laterality Date   • KNEE SURGERY Right 2019    athroscope       Allergies   Allergen Reactions   • Ace Inhibitors Other (See Comments) and Cough     cough         Current Outpatient Medications:   •  albuterol sulfate  (90 Base) MCG/ACT inhaler, Inhale 2 puffs 4 (Four) Times a Day., Disp: , Rfl:   •  amLODIPine-benazepril (LOTREL) 10-20 MG per capsule, Take 1 capsule by mouth Daily., Disp: , Rfl:   •  citalopram (CeleXA) 10 MG tablet, Take 1 tablet by mouth Daily., Disp: , Rfl:   •  ipratropium-albuterol (Combivent Respimat)  MCG/ACT inhaler, Inhale 1 puff Daily., Disp: , Rfl:   •  metFORMIN (GLUCOPHAGE) 1000 MG tablet, Take 1,000 mg by mouth 2 (Two) Times a Day With Meals., Disp: , Rfl:   •  potassium chloride ER (K-TAB) 20 MEQ tablet controlled-release ER tablet, Take 20 mEq by mouth Daily., Disp: , Rfl:   •  valsartan (DIOVAN) 40 MG tablet, Take 40 mg by mouth Daily., Disp: , Rfl:     Social History     Socioeconomic History   • Marital status:      Spouse name: Not on file   • Number of children: Not on file   • Years of education: Not on file   • Highest education level: Not on file   Tobacco Use   • Smoking status: Former Smoker     Years: 15.00     Types: Cigars   • Smokeless tobacco: Never Used   • Tobacco comment: QUIT 5 YR AGO   Vaping Use   • Vaping Use: Never used   Substance and Sexual Activity   • Alcohol use: Yes     Comment: OCCASIONALLY   • Drug use: No   • Sexual activity: Defer       Family History   Problem Relation Age of Onset   • Diabetes Mother    • Hypertension Mother    • Stroke Mother    • Hypertension Father    • Stroke Father    • Heart attack Father    •  Cancer Maternal Grandmother    • Malig Hyperthermia Neg Hx          Review of Systems:  Review of Systems   Constitutional: Positive for appetite change and fatigue. Negative for unexpected weight change.   HENT: Negative.    Eyes: Negative.    Respiratory: Positive for cough (dry), shortness of breath (exertional and at rest) and wheezing (at times, not currently).    Cardiovascular: Negative.  Negative for leg swelling.   Gastrointestinal: Negative for abdominal distention, abdominal pain, constipation, diarrhea, nausea and vomiting.   Genitourinary: Negative for difficulty urinating, frequency and urgency.   Musculoskeletal: Negative for back pain.   Skin: Negative.    Neurological: Positive for tremors.        Memory loss   Psychiatric/Behavioral: Negative.    All other systems reviewed and are negative.      Physical Exam:  Vital Signs:  Weight: (!) 161 kg (356 lb)   Body mass index is 52.13 kg/m².  Temp: 97.3 °F (36.3 °C)   Heart Rate: 111   BP: (!) 185/116     Physical Exam  Vitals and nursing note reviewed.   Constitutional:       Appearance: He is well-developed.   HENT:      Head: Normocephalic and atraumatic.   Eyes:      Pupils: Pupils are equal, round, and reactive to light.   Cardiovascular:      Rate and Rhythm: Regular rhythm. Tachycardia present.   Pulmonary:      Effort: Pulmonary effort is normal. No respiratory distress.      Breath sounds: No decreased air movement. Examination of the right-lower field reveals decreased breath sounds. Examination of the left-lower field reveals decreased breath sounds. Decreased breath sounds present. No wheezing.      Comments: Patient reports increased effort when asked to take deep breaths, and has a mild dry cough exacerbated by deep breathing.  Abdominal:      General: Bowel sounds are normal. There is no distension.      Palpations: Abdomen is soft.      Tenderness: There is no abdominal tenderness.   Musculoskeletal:         General: Normal range of  motion.      Cervical back: Normal range of motion.   Skin:     General: Skin is warm and dry.   Neurological:      Mental Status: He is alert and oriented to person, place, and time.   Psychiatric:         Behavior: Behavior normal.            Assessment:         Rula Bardales is a 40 y.o. year old male with medically complicated severe obesity. Weight: (!) 161 kg (356 lb), Body mass index is 52.13 kg/m². and weight related problems including sleep apnea, hypertension, back pain and knee pain.    I explained in detail, potential surgical options of interest to the patient including the RNY gastric bypass, sleeve gastrectomy, and gastric band while considering the patient's medical history. At this time, the patient expressed interest in the Laparoscopic Sleeve  All of those procedures can be performed laparoscopically but there is a chance to convert to open if any technical challenges or complications do occur.  Bariatric surgery is not cosmetic surgery but rather a tool to help a patient make a life-long commitment lifestyle changes including diet, exercise, behavior changes, and taking supplemental vitamins and minerals.    Due to the patient's BMI, history, and co-morbidities related to potential surgical complications were evaluated. Due to Rula Bardales's risk factors male will obtain the following prior to being scheduled for surgical intervention:    A letter of medical support as well as a history and physical must be obtained from his primary care provider. The patient was given a copy of a sample form, that will suffice as their letter to take to their primary are provider.    A referral for pre-operative psychological evaluation was ordered for the patient to evaluate candidacy as well as provide mental health support, should it be warranted before or after surgery.    CBC and CMP from 1/21/21 which did result in anemia. Patient did test positive for Covid-19 and developed acute respiratory and  renal failure. He now suffers from SOA on exertion, and sometimes at rest, ongoing fatigue, and memory impairment. He established care with a new PCP a little under 1 month ago who made several changes to his medication regimen and is following. EKG, CXR, EGD with biopsy, psychology clearance, Hba1c and TSHwere ordered at this time. These will be drawn and patient will be notified with results. Patient will complete new, pre-operative radiology prior to being scheduled for surgery. Rula Bardales will obtain clearance from pulmonology prior to surgery.     Rula Bardales reports consistent use of his CPAP machine. The risks, as they relate to chronic hypercapnia r/t untreated AMIRA were discussed with the patient and He verbalized understanding.     A pre-operative diagnostic esophagogastroduodenoscopy with biopsy for evaluation will be ordered and scheduled for this patient. The risks and benefits of the procedure were discussed with the patient in detail and all questions were answered.  Possibility of perforation, bleeding, aspiration, anoxic brain injury, respiratory and/or cardiac arrest and death were discussed.   He received handouts regarding, all questions were answered.     Rula Bardales verbalized understanding related to COVID-19 pre-procedure testing policies and has consented to a preoperative test 48-72 hours before He's scheduled EGD and bariatric surgical procedure.     The risks, benefits, alternatives, and potential complications of all of the sleeve gastrectomy explained in detail including, but not limited to death, anesthesia and medication adverse effect/DVT, pulmonary embolism, trocar site/incisional hernia, wound infection, abdominal infection, bleeding, failure to lose weight or gain weight and change in body image, metabolic complications with calcium, thiamine, vitamin B12, folate, iron, and anemia.    The patient was advised to start a high protein, low fat and low carbohydrate diet   start routine exercise including but not limited to 150 minutes per week. The patient received a resistance band along with a handout of exercises. The patient was given individualized information by our dietician along with handouts.     The patient was given information regarding the JOESPH educational video. JOESPH is an internet based educational video which explains the sourgical procedure and answers basic questions regarding the procedure. The patient was provided with instructions and a password to watch the video.    The patient understands the surgical procedures and the different surgical options that are available.  He understands the lifestyle changes that would be required after surgery and has agreed to participate in a pre-operative and postoperative weight management program.  He also expressed understanding of possible risks, had several questions answered and desires to proceed.    I think he is a good candidate for this surgery, and is interested in a sleeve gastrectomy.    Encounter Diagnoses   Name Primary?   • Body mass index (BMI) of 50-59.9 in adult (CMS/[facility], [facility]) Yes   • Diabetes mellitus type 2 in obese (CMS/[facility])    • Essential hypertension    • Multiple joint pain    • AMIRA (obstructive sleep apnea)    • Chronic fatigue    • Shortness of breath    • Acute respiratory failure with hypoxia (CMS/[facility])        Plan:    Patient will be evaluated by a bariatric dietician psychologist before undergoing a multidisciplinary review of his candidacy. We discussed weight loss requirements prior to surgery and rationale, as well as other program requirements to ensure the safest approach to surgery. We spent time discussing different surgical procedures and plan of care throughout their lifespan to ensure long term success in achieving and maintaining a healthier weight. Patient will proceed with preoperative lab work, radiology, and endoscopy after obtaining agreed upon specialty, clearances, sleep study, and  letter of support from He primary care provider.    Total time spent during this encounter today was 70 minutes and includes preparing for the visit, reviewing tests, performing a medically appropriate examination and/or evaluations, counseling and educating the patient/family/caregiver, ordering medications, tests, or procedures, documenting information in the medical record and independently interpreting results and communicating that information with the patient/family/caregiver  LATONYA Eng  4/20/2021

## 2021-04-26 NOTE — PROGRESS NOTES
"Bariatric Nutrition Counseling Interview    Patient Name: Rula Bardales    YOB: 1980   Age: 40 y.o.  Sex: male  MRN: [id number]     Procedure considering: sleeve    Height: 69.29\"            Current weight: 356 lbs   BMI: 52.13 kg/m²                          Highest weight: 410 lbs                              Lowest weight: 240 lbs     Patient goals: 230 lbs  Weight history: gained weight over time due to decreased activity and increased calorie intake  Additional health issues to consider: HTN, diabetes, sleep apnea, back/knee pain    Patient has tried to lose weight in the past including reduced calorie, lowfat diet and jogging. Pt has lost up to 76 lbs on diets, but was unable to maintain this long term.     Diet history revealed no diet restrictions or food allergies. Diet recall: B: protein shake or oatmeal raisin cereal or eggs/turkey sausage; L: hot dog or turkey sandwich, sometimes chips; D: chicken, green beans, min slaw, fries/potatoes/macaroni & cheese; S: almonds, trail mix, jello, pudding, peanut butter crackers. Drinking diet soda, orange juice, water. Eating out 3 times/wk. 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 Wife had weight loss surgery and is supportive.   Pt identifies problem areas to be eating out of boredom, eating large portions, snacking on high calorie foods.      Exercise: walking around complex where he lives daily to build up endurance     Pre and post op nutrition education completed and program materials provided. Emphasized the importance of taking in at least 70 g protein and 64 oz fluid daily. Discussed personal habits and lifestyle behaviors that may influence weight loss efforts. Patient verbalized understanding and questions were answered.  Short term goals: decrease orange juice and carbonated beverages  Additional nutrition counseling available and encouraged both pre and post op.  Pt demonstrated a good comprehension " of diet requirements and commitment to make healthy changes. Pt appears to be a suitable candidate for bariatric surgery from a nutritional standpoint.      Bertha Morocho RD  04/26/21  08:59 EDT

## 2021-05-21 ENCOUNTER — TELEPHONE (OUTPATIENT)
Dept: BARIATRICS/WEIGHT MGMT | Facility: CLINIC | Age: 41
End: 2021-05-21

## 2021-05-21 ENCOUNTER — APPOINTMENT (OUTPATIENT)
Dept: BARIATRICS/WEIGHT MGMT | Facility: CLINIC | Age: 41
End: 2021-05-21

## 2021-05-26 NOTE — TELEPHONE ENCOUNTER
Called patient to follow up on nutrition pre-op. Rula denied any questions/concerns at this time. He is making an effort to follow high protein, low carbohydrate, low fat diet recommendations and decrease intake of soft drinks. Patient demonstrated good understanding of nutrition guidelines and commitment to make necessary lifestyle changes. He appears to be a good candidate for bariatric surgery from a nutrition standpoint.

## 2021-06-17 ENCOUNTER — LAB (OUTPATIENT)
Dept: LAB | Facility: HOSPITAL | Age: 41
End: 2021-06-17

## 2021-06-17 ENCOUNTER — HOSPITAL ENCOUNTER (OUTPATIENT)
Dept: CARDIOLOGY | Facility: HOSPITAL | Age: 41
Discharge: HOME | End: 2021-06-17

## 2021-06-17 ENCOUNTER — HOSPITAL ENCOUNTER (OUTPATIENT)
Dept: GENERAL RADIOLOGY | Facility: HOSPITAL | Age: 41
Discharge: HOME | End: 2021-06-17

## 2021-06-17 DIAGNOSIS — J96.01 ACUTE RESPIRATORY FAILURE WITH HYPOXIA (HCC): ICD-10-CM

## 2021-06-17 DIAGNOSIS — M25.50 MULTIPLE JOINT PAIN: ICD-10-CM

## 2021-06-17 DIAGNOSIS — G47.33 OSA (OBSTRUCTIVE SLEEP APNEA): ICD-10-CM

## 2021-06-17 DIAGNOSIS — R53.82 CHRONIC FATIGUE: ICD-10-CM

## 2021-06-17 DIAGNOSIS — R06.02 SHORTNESS OF BREATH: ICD-10-CM

## 2021-06-17 DIAGNOSIS — I10 ESSENTIAL HYPERTENSION: ICD-10-CM

## 2021-06-17 DIAGNOSIS — E11.69 DIABETES MELLITUS TYPE 2 IN OBESE (HCC): ICD-10-CM

## 2021-06-17 DIAGNOSIS — E66.9 DIABETES MELLITUS TYPE 2 IN OBESE (HCC): ICD-10-CM

## 2021-06-17 LAB
HBA1C MFR BLD: 9.03 % (ref 4.8–5.6)
QT INTERVAL: 374 MS
TSH SERPL DL<=0.05 MIU/L-ACNC: 0.74 UIU/ML (ref 0.27–4.2)

## 2021-06-17 PROCEDURE — 93010 ELECTROCARDIOGRAM REPORT: CPT | Performed by: INTERNAL MEDICINE

## 2021-06-17 PROCEDURE — 84443 ASSAY THYROID STIM HORMONE: CPT

## 2021-06-17 PROCEDURE — 83036 HEMOGLOBIN GLYCOSYLATED A1C: CPT

## 2021-06-17 PROCEDURE — 71046 X-RAY EXAM CHEST 2 VIEWS: CPT

## 2021-06-17 PROCEDURE — 93005 ELECTROCARDIOGRAM TRACING: CPT | Performed by: NURSE PRACTITIONER

## 2021-06-17 PROCEDURE — 36415 COLL VENOUS BLD VENIPUNCTURE: CPT

## 2021-07-13 ENCOUNTER — TRANSCRIBE ORDERS (OUTPATIENT)
Dept: SLEEP MEDICINE | Facility: HOSPITAL | Age: 41
End: 2021-07-13

## 2021-07-13 DIAGNOSIS — Z01.818 OTHER SPECIFIED PRE-OPERATIVE EXAMINATION: Primary | ICD-10-CM

## 2021-07-24 ENCOUNTER — LAB (OUTPATIENT)
Dept: LAB | Facility: HOSPITAL | Age: 41
End: 2021-07-24

## 2021-07-24 DIAGNOSIS — Z01.818 OTHER SPECIFIED PRE-OPERATIVE EXAMINATION: ICD-10-CM

## 2021-07-24 LAB — SARS-COV-2 ORF1AB RESP QL NAA+PROBE: NOT DETECTED

## 2021-07-24 PROCEDURE — C9803 HOPD COVID-19 SPEC COLLECT: HCPCS

## 2021-07-24 PROCEDURE — U0004 COV-19 TEST NON-CDC HGH THRU: HCPCS

## 2021-07-27 ENCOUNTER — ANESTHESIA EVENT (OUTPATIENT)
Dept: GASTROENTEROLOGY | Facility: HOSPITAL | Age: 41
End: 2021-07-27

## 2021-07-27 ENCOUNTER — HOSPITAL ENCOUNTER (OUTPATIENT)
Facility: HOSPITAL | Age: 41
Setting detail: HOSPITAL OUTPATIENT SURGERY
Discharge: HOME | End: 2021-07-27
Attending: SURGERY | Admitting: SURGERY

## 2021-07-27 ENCOUNTER — ANESTHESIA (OUTPATIENT)
Dept: GASTROENTEROLOGY | Facility: HOSPITAL | Age: 41
End: 2021-07-27

## 2021-07-27 VITALS
SYSTOLIC BLOOD PRESSURE: 137 MMHG | OXYGEN SATURATION: 98 % | RESPIRATION RATE: 16 BRPM | DIASTOLIC BLOOD PRESSURE: 88 MMHG | HEART RATE: 86 BPM | HEIGHT: 70 IN | WEIGHT: 315 LBS | TEMPERATURE: 97.4 F | BODY MASS INDEX: 45.1 KG/M2

## 2021-07-27 DIAGNOSIS — M25.50 MULTIPLE JOINT PAIN: ICD-10-CM

## 2021-07-27 DIAGNOSIS — E66.9 DIABETES MELLITUS TYPE 2 IN OBESE (HCC): ICD-10-CM

## 2021-07-27 DIAGNOSIS — R53.82 CHRONIC FATIGUE: ICD-10-CM

## 2021-07-27 DIAGNOSIS — I10 ESSENTIAL HYPERTENSION: ICD-10-CM

## 2021-07-27 DIAGNOSIS — E11.69 DIABETES MELLITUS TYPE 2 IN OBESE (HCC): ICD-10-CM

## 2021-07-27 DIAGNOSIS — G47.33 OSA (OBSTRUCTIVE SLEEP APNEA): ICD-10-CM

## 2021-07-27 DIAGNOSIS — R06.02 SHORTNESS OF BREATH: ICD-10-CM

## 2021-07-27 DIAGNOSIS — J96.01 ACUTE RESPIRATORY FAILURE WITH HYPOXIA (HCC): ICD-10-CM

## 2021-07-27 PROBLEM — K21.00 GASTROESOPHAGEAL REFLUX DISEASE WITH ESOPHAGITIS WITHOUT HEMORRHAGE: Status: ACTIVE | Noted: 2021-07-27

## 2021-07-27 LAB — GLUCOSE BLDC GLUCOMTR-MCNC: 275 MG/DL (ref 70–130)

## 2021-07-27 PROCEDURE — 25010000002 PROPOFOL 10 MG/ML EMULSION: Performed by: ANESTHESIOLOGY

## 2021-07-27 PROCEDURE — 43239 EGD BIOPSY SINGLE/MULTIPLE: CPT | Performed by: SURGERY

## 2021-07-27 PROCEDURE — 88305 TISSUE EXAM BY PATHOLOGIST: CPT | Performed by: SURGERY

## 2021-07-27 PROCEDURE — 82962 GLUCOSE BLOOD TEST: CPT

## 2021-07-27 PROCEDURE — 87081 CULTURE SCREEN ONLY: CPT | Performed by: SURGERY

## 2021-07-27 RX ORDER — PROPOFOL 10 MG/ML
VIAL (ML) INTRAVENOUS AS NEEDED
Status: DISCONTINUED | OUTPATIENT
Start: 2021-07-27 | End: 2021-07-27 | Stop reason: SURG

## 2021-07-27 RX ORDER — SODIUM CHLORIDE, SODIUM LACTATE, POTASSIUM CHLORIDE, CALCIUM CHLORIDE 600; 310; 30; 20 MG/100ML; MG/100ML; MG/100ML; MG/100ML
1000 INJECTION, SOLUTION INTRAVENOUS CONTINUOUS
Status: DISCONTINUED | OUTPATIENT
Start: 2021-07-27 | End: 2021-07-27 | Stop reason: HOSPADM

## 2021-07-27 RX ORDER — LIDOCAINE HYDROCHLORIDE 20 MG/ML
INJECTION, SOLUTION INFILTRATION; PERINEURAL AS NEEDED
Status: DISCONTINUED | OUTPATIENT
Start: 2021-07-27 | End: 2021-07-27 | Stop reason: SURG

## 2021-07-27 RX ORDER — PROPOFOL 10 MG/ML
VIAL (ML) INTRAVENOUS CONTINUOUS PRN
Status: DISCONTINUED | OUTPATIENT
Start: 2021-07-27 | End: 2021-07-27 | Stop reason: SURG

## 2021-07-27 RX ORDER — PANTOPRAZOLE SODIUM 40 MG/1
40 TABLET, DELAYED RELEASE ORAL DAILY
Qty: 30 TABLET | Refills: 5 | Status: SHIPPED | OUTPATIENT
Start: 2021-07-27 | End: 2021-11-18

## 2021-07-27 RX ADMIN — PROPOFOL 100 MG: 10 INJECTION, EMULSION INTRAVENOUS at 07:42

## 2021-07-27 RX ADMIN — Medication 200 MCG/KG/MIN: at 07:41

## 2021-07-27 RX ADMIN — SODIUM CHLORIDE, POTASSIUM CHLORIDE, SODIUM LACTATE AND CALCIUM CHLORIDE 1000 ML: 600; 310; 30; 20 INJECTION, SOLUTION INTRAVENOUS at 07:20

## 2021-07-27 RX ADMIN — LIDOCAINE HYDROCHLORIDE 60 MG: 20 INJECTION, SOLUTION INFILTRATION; PERINEURAL at 07:41

## 2021-07-27 RX ADMIN — PROPOFOL 200 MG: 10 INJECTION, EMULSION INTRAVENOUS at 07:41

## 2021-07-27 NOTE — H&P
Patient Care Team:  Terra Gomez APRN as PCP - General (Family Medicine)    Chief complaint Heartburn and in need of preoperative clearance prior to surgery    Subjective     Patient is a 40 y.o. male who is a patient of ours and has undergone our extensive initial evaluation for bariatric surgery and needs to proceed with upper endoscopy for preoperative clearance prior to proceeding with surgery.  Please see the initial history and physical for further detailed information.      Review of Systems   Pertinent items are noted in HPI and no changes since last visit.    Past Medical History:   Diagnosis Date   • Arthritis    • Diabetes mellitus (CMS/HCC)    • History of 2019 novel coronavirus disease (COVID-19) 01/26/2021    PLACED IN COMA   • Hyperlipidemia    • Hypertension    • Right knee pain    • Sleep apnea     CPAP     Past Surgical History:   Procedure Laterality Date   • KNEE SURGERY Right 2019    athroscope-MENISCUS REPAIR     Family History   Problem Relation Age of Onset   • Diabetes Mother    • Hypertension Mother    • Stroke Mother    • Hypertension Father    • Stroke Father    • Heart attack Father    • Cancer Maternal Grandmother    • Malig Hyperthermia Neg Hx      Social History     Tobacco Use   • Smoking status: Former Smoker     Years: 15.00     Types: Cigars   • Smokeless tobacco: Never Used   • Tobacco comment: QUIT 5 YR AGO   Vaping Use   • Vaping Use: Never used   Substance Use Topics   • Alcohol use: Yes     Comment: OCCASIONALLY   • Drug use: No     Medications Prior to Admission   Medication Sig Dispense Refill Last Dose   • albuterol sulfate  (90 Base) MCG/ACT inhaler Inhale 2 puffs 4 (Four) Times a Day.      • amLODIPine-benazepril (LOTREL) 10-20 MG per capsule Take 1 capsule by mouth Daily.      • metFORMIN (GLUCOPHAGE) 1000 MG tablet Take 1,000 mg by mouth 2 (Two) Times a Day With Meals.        Allergies:  Ace inhibitors    Vital Signs  See PreOp record            Physical  Exam:   Heart: RR  Lungs: CTA B  Abd: soft and NT/ND  Ext: no clubbing, cyanosis    Results Review:    I have reviewed the patient's clinical results      Chronic fatigue    AMIRA (obstructive sleep apnea)    Multiple joint pain    Diabetes mellitus type 2 in obese (CMS/HCC)    Body mass index (BMI) of 50-59.9 in adult (CMS/[facility])    Essential hypertension    Acute respiratory failure with hypoxia (CMS/[facility])    Shortness of breath      The risks and benefits of the procedure were discussed with the patient in detail and all questions were answered.  Possibility of perforation, bleeding, aspiration, anoxic brain injury, respiratory and/or cardiac arrest and death were discussed.  Consent will be signed and witnessed..     Farzad Polo MD  07/27/21  06:52 EDT  Time: Approximately 15 minutes was spent with the patient.  All of the patients questions were answered.

## 2021-07-27 NOTE — ANESTHESIA PREPROCEDURE EVALUATION
Anesthesia Evaluation     Patient summary reviewed and Nursing notes reviewed                Airway   Mallampati: III  TM distance: >3 FB  Neck ROM: full  Difficult intubation highly probable and Large neck circumference  Dental - normal exam     Pulmonary - normal exam   (+) a smoker Former, shortness of breath, sleep apnea on CPAP,     ROS comment: Hx COVID-19  Cardiovascular - normal exam    (+) hypertension 2 medications or greater, hyperlipidemia,       Neuro/Psych  GI/Hepatic/Renal/Endo    (+) obesity, morbid obesity,  renal disease, diabetes mellitus type 2,     Musculoskeletal     Abdominal   (+) obese,    Substance History      OB/GYN          Other   arthritis,                    Anesthesia Plan    ASA 3     MAC     intravenous induction     Anesthetic plan, all risks, benefits, and alternatives have been provided, discussed and informed consent has been obtained with: patient.

## 2021-07-27 NOTE — OP NOTE
Surgeon: Farzad Polo Jr., M.D.    Preoperative Diagnosis: Dyspepsia    Postoperative Diagnosis: #1 gastritis #2 LA grade a esophagitis #3 questionable small hiatal hernia    Procedure Performed: Transoral esophagogastroduodenoscopy with gastric antral and distal esophageal biopsies    Indications: 40-year-old gentleman who presents for preop evaluation.  Patient does not take H2 blocker or PPI on regular basis    Procedure:     The procedure, indications, preparation and potential complications were explained to the patient, who indicated understanding and signed the corresponding consent forms.  The patient was identified, taken to the endoscopy suite, and placed on the left side down decubitus position.  The patient underwent a MAC anesthesia and was appropriately monitored through the case by the anesthesia personnel with continuous pulse oximetry, blood pressure, and cardiac monitoring.  A bite block was placed.  After adequate IV sedation and using a transoral technique a lubed flexible endoscope was placed in the hypopharynx and advanced to the second portion of the duodenum without difficulty. The scope was then withdrawn back into the antrum of the stomach.  Cold forcep biopsies of the antrum were taken to rule out Helicobacter pylori.  The scope was retroflexed noting the body, fundus and cardia.  The scope was then withdrawn back into the esophagus after decompressing the stomach.  The Z line was noted and GE junction measured from the incisors.  The scope was then completely withdrawn.  The patient tolerated the procedure well and left the endoscopy suite in stable condition.  The findings are listed below.    Duodenum: Unremarkable  Antrum: Moderate patchy erythema  Body/Fundus: Unremarkable  Cardia: Questionable small defect  Esophagus: Z-line irregular with 2 areas of erythema extending proximally less than 5 mm.  Multiple cold forcep biopsies taken to rule out Albert's.  No active bleeding  noted    Recommendations:     We will start on PPI and await biopsy results follow-up in the office as scheduled

## 2021-07-27 NOTE — ANESTHESIA POSTPROCEDURE EVALUATION
Patient: Rula Bardales    Procedure Summary     Date: 07/27/21 Room / Location: [facility] ENDOSCOPY 1 /  [facility]    Anesthesia Start: 0734 Anesthesia Stop: 0751    Procedure: ESOPHAGOGASTRODUODENOSCOPY WITH BIOPSY (N/A Esophagus) Diagnosis:       Body mass index (BMI) of 50-59.9 in adult (CMS/[facility])      Diabetes mellitus type 2 in obese (CMS/[facility])      Essential hypertension      Multiple joint pain      AMIRA (obstructive sleep apnea)      Chronic fatigue      Shortness of breath      Acute respiratory failure with hypoxia (CMS/HCC)      (Body mass index (BMI) of 50-59.9 in adult (CMS/HCC) [Z68.43])      (Diabetes mellitus type 2 in obese (CMS/HCC) [E11.69, E66.9])      (Essential hypertension [I10])      (Multiple joint pain [M25.50])      (AMIRA (obstructive sleep apnea) [G47.33])      (Chronic fatigue [R53.82])      (Shortness of breath [R06.02])      (Acute respiratory failure with hypoxia (CMS/[facility]) [J96.01])    Surgeons: Farzad Polo Jr., MD Provider: Neil Morrison MD    Anesthesia Type: MAC ASA Status: 3          Anesthesia Type: MAC    Vitals  No vitals data found for the desired time range.          Post Anesthesia Care and Evaluation    Patient location during evaluation: PHASE II  Patient participation: complete - patient participated  Level of consciousness: awake and alert  Pain management: adequate  Airway patency: patent  Anesthetic complications: No anesthetic complications  PONV Status: none  Cardiovascular status: acceptable and hemodynamically stable  Respiratory status: acceptable, nonlabored ventilation and spontaneous ventilation  Hydration status: acceptable

## 2021-07-28 LAB
CYTO UR: NORMAL
LAB AP CASE REPORT: NORMAL
PATH REPORT.FINAL DX SPEC: NORMAL
PATH REPORT.GROSS SPEC: NORMAL
UREASE TISS QL: NEGATIVE

## 2021-07-29 ENCOUNTER — TELEPHONE (OUTPATIENT)
Dept: BARIATRICS/WEIGHT MGMT | Facility: CLINIC | Age: 41
End: 2021-07-29

## 2021-07-29 NOTE — TELEPHONE ENCOUNTER
Spoke to patient. Need to make sure a1c at acceptable level prior to surgery. Left vm at pcp to call our office. Patient is to follow up with them as well with note or any recent labs. Patient verbalized understanding.

## 2021-10-14 NOTE — PROGRESS NOTES
"Physical Therapy Initial Evaluation and Plan of Care    TIME IN 1402 TIME OUT 1448  Patient: Rula Bardales   : 1980  Diagnosis/ICD-10 Code:  Acute pain of right knee [M25.561]  Referring practitioner: YANETH Ba    Subjective Evaluation    History of Present Illness  Date of onset: 2018  Mechanism of injury: [place] [facility] down and (R) knee \"went out to the side\" (patient indicates laterally) as he fell.  Immediate onset of (R) medial knee pain.  Had progressive difficulty standing and walking.  Positive for swelling.     Patient to occ med same day.  Told to ice, use crutches as needed, use Neoprene sleeve (not wearing today), and steroid pack.  At follow-up appointment was given Ibuprofen and told to begin PT. Reports he still uses the crutches sometimes when the pain \"gets too bad\" (majority of time ambulating without A.D.).    Pain is worse with standing and walking, but experiences stiffness with prolonged sitting. Reports he is sleeping okay but may have pain with turning over in bed.    Reports occasional \"popping\" but denies significant locking.  Reports when he first gets out of bed in the morning his knee feels like it wants to give way/not fully support body weight.    Denies prior (R) knee injury. Reports injured his (L) knee running last year and has occasional popping but no significant pain.      Patient Occupation: [employer] -    Precautions and Work Restrictions: see scanned restrictions; off work currently; patient checks with employer each dayQuality of life: good    Pain  Current pain ratin  At best pain ratin  Pain scale at highest: 9-10/10.  Location: (R) medial knee, generalized  Quality: throbbing and sharp (sharp with quick or suddent movements)  Relieving factors: heat  Aggravating factors: standing, ambulation, stairs and squatting (changing directions while walking, bending knee, straightening " "knee)  Progression: improved (\"pain is not as bad unless I stand on it a lot\")    Social Support  Patient lives at: 4 steps inside house.    Hand dominance: right    Diagnostic Tests  No diagnostic tests performed    Treatments  Current treatment: medication and physical therapy  Current treatment comments: work activity modifications/restrictions.     Patient Goals  Patient goals for therapy: decreased pain and return to work  Patient goal: \"I just want to go back to work because my bills have to be paid\"           Objective       Tenderness     Right Knee   Tenderness in the MCL (distal), MCL (proximal) and medial joint line.     Additional Tenderness Details  Generalized mod/max (+) TTP over (R) medial knee area    Active Range of Motion   Left Knee   Flexion: 137 degrees   Extension: 0 degrees   Extensor la degrees     Right Knee   Flexion: 70 degrees with pain  Extension: 15 degrees with pain  Extensor lag: 15 degrees with pain    Strength/Myotome Testing     Left Hip   Planes of Motion   Flexion: 5    Right Hip   Planes of Motion   Flexion: 4-    Left Knee   Flexion: 5  Extension: 5  Quadriceps contraction: good    Right Knee   Flexion: 4- (with pain, within available ROM)  Extension: 4- (with pain, within available ROM)  Quadriceps contraction: fair    Left Ankle/Foot   Dorsiflexion: 5    Right Ankle/Foot   Dorsiflexion: 4+    Tests     Right Knee   Positive bounce home, medial Evelyn, valgus stress test at 0 degrees and valgus stress test at 30 degrees.   Negative lateral Evelyn, varus stress test at 0 degrees and varus stress test at 30 degrees.     Additional Tests Details  Patient unable to fully weightbear (R) LE for Thessaly test due to feeling like \"my leg will give out\"    General Comments     Knee Comments  Unable to fully access (R) knee this date due to pants restriction          Assessment & Plan     Assessment  Impairments: abnormal gait, abnormal muscle firing, abnormal or restricted " No ROM, activity intolerance, impaired balance, impaired physical strength, lacks appropriate home exercise program, pain with function and weight-bearing intolerance  Prognosis: good  Functional Limitations: lifting, sleeping, walking, pulling, pushing, uncomfortable because of pain, moving in bed, sitting, standing and stooping  Goals  Plan Goals: STGs: to be met in 2 weeks  1. Patient will be independent with initial ROM HEP  2. Patient will report improved (R) knee s/s 2-5/10  3. Patient will exhibit min/mod (+) TTP over (R) medial knee  4. Patient will demonstrate improved (R) knee AROM 5-100 deg  5. Patient will tolerate weightbearing approx. 75% of weight (R) LE 10 sec x 10 reps    LTGs: to be met in 4 weeks  1. Patient will be independent with progressed strength and balance HEP  2. Patient will report improved (R) knee s/s 0-1/10  3. Patient will exhibit negative special testing (R) knee   4. Patient will demonstrate improved (R) knee AROM 0-135 deg, painfree  5. Patient will demonstrate full resolution of (R) LE swelling (within 0.5 cm of left)  6. Patient will consistently ambulate community distances without antalgia, perform functional squat, and negotiate steps reciprocally painfree  7. Patient will demonstrate ability to safely perform lifting, carrying, pushing, and pulling at level sufficient to demonstrate readiness for RTW without restrictions    Plan  Therapy options: will be seen for skilled physical therapy services  Planned modality interventions: cryotherapy, electrical stimulation/Russian stimulation, iontophoresis, thermotherapy (hydrocollator packs) and ultrasound  Planned therapy interventions: balance/weight-bearing training, flexibility, functional ROM exercises, gait training, home exercise program, joint mobilization, manual therapy, neuromuscular re-education, soft tissue mobilization, strengthening, stretching and therapeutic activities  Frequency: 3x week  Duration in weeks:  4  Treatment plan discussed with: patient        Manual Therapy:         mins  24809;  Therapeutic Exercise:    21     mins  55515;     Neuromuscular Zoe:        mins  39142;    Therapeutic Activity:     10     mins  51652;     Gait Training:           mins  49945;     Ultrasound:          mins  37318;    Electrical Stimulation:         mins  49851 ( );  Dry Needling          mins self-pay    Timed Treatment:   31   mins   Total Treatment:     46   mins    PT SIGNATURE: Mindy Jenkins PT, DPT   DATE TREATMENT INITIATED: 2/2/2018    Initial Certification  Certification Period: 5/3/2018  I certify that the therapy services are furnished while this patient is under my care.  The services outlined above are required by this patient, and will be reviewed every 90 days.     PHYSICIAN: YANETH Ba      DATE:     Please sign and return via fax to [phone].. Thank you, [facility].

## 2021-11-04 ENCOUNTER — PREP FOR SURGERY (OUTPATIENT)
Dept: OTHER | Facility: HOSPITAL | Age: 41
End: 2021-11-04

## 2021-11-04 DIAGNOSIS — E66.01 CLASS 3 SEVERE OBESITY DUE TO EXCESS CALORIES WITH SERIOUS COMORBIDITY AND BODY MASS INDEX (BMI) OF 50.0 TO 59.9 IN ADULT (HCC): Primary | ICD-10-CM

## 2021-11-04 PROBLEM — E66.813 CLASS 3 SEVERE OBESITY DUE TO EXCESS CALORIES WITH SERIOUS COMORBIDITY AND BODY MASS INDEX (BMI) OF 50.0 TO 59.9 IN ADULT: Status: ACTIVE | Noted: 2021-11-04

## 2021-11-04 RX ORDER — SCOLOPAMINE TRANSDERMAL SYSTEM 1 MG/1
1 PATCH, EXTENDED RELEASE TRANSDERMAL CONTINUOUS
Status: CANCELLED | OUTPATIENT
Start: 2021-12-08 | End: 2021-12-11

## 2021-11-04 RX ORDER — SODIUM CHLORIDE 0.9 % (FLUSH) 0.9 %
3 SYRINGE (ML) INJECTION EVERY 12 HOURS SCHEDULED
Status: CANCELLED | OUTPATIENT
Start: 2021-11-04

## 2021-11-04 RX ORDER — SODIUM CHLORIDE, SODIUM LACTATE, POTASSIUM CHLORIDE, CALCIUM CHLORIDE 600; 310; 30; 20 MG/100ML; MG/100ML; MG/100ML; MG/100ML
100 INJECTION, SOLUTION INTRAVENOUS CONTINUOUS
Status: CANCELLED | OUTPATIENT
Start: 2021-12-08

## 2021-11-04 RX ORDER — PANTOPRAZOLE SODIUM 40 MG/10ML
40 INJECTION, POWDER, LYOPHILIZED, FOR SOLUTION INTRAVENOUS ONCE
Status: CANCELLED | OUTPATIENT
Start: 2021-12-08 | End: 2021-11-04

## 2021-11-04 RX ORDER — PROMETHAZINE HYDROCHLORIDE 12.5 MG/1
12.5 TABLET ORAL ONCE
Status: CANCELLED | OUTPATIENT
Start: 2021-12-08 | End: 2021-11-04

## 2021-11-04 RX ORDER — CHLORHEXIDINE GLUCONATE 0.12 MG/ML
15 RINSE ORAL SEE ADMIN INSTRUCTIONS
Status: CANCELLED | OUTPATIENT
Start: 2021-12-08

## 2021-11-04 RX ORDER — CEFAZOLIN SODIUM IN 0.9 % NACL 3 G/100 ML
3 INTRAVENOUS SOLUTION, PIGGYBACK (ML) INTRAVENOUS
Status: CANCELLED | OUTPATIENT
Start: 2021-12-08

## 2021-11-04 RX ORDER — ACETAMINOPHEN 160 MG/5ML
975 SOLUTION ORAL ONCE
Status: CANCELLED | OUTPATIENT
Start: 2021-12-08 | End: 2021-11-04

## 2021-11-04 RX ORDER — PROMETHAZINE HYDROCHLORIDE 25 MG/1
25 SUPPOSITORY RECTAL ONCE
Status: CANCELLED | OUTPATIENT
Start: 2021-12-08

## 2021-11-04 RX ORDER — SODIUM CHLORIDE 0.9 % (FLUSH) 0.9 %
3-10 SYRINGE (ML) INJECTION AS NEEDED
Status: CANCELLED | OUTPATIENT
Start: 2021-12-08

## 2021-11-04 RX ORDER — GABAPENTIN 250 MG/5ML
300 SOLUTION ORAL ONCE
Status: CANCELLED | OUTPATIENT
Start: 2021-12-08 | End: 2021-11-04

## 2021-11-04 RX ORDER — METOCLOPRAMIDE HYDROCHLORIDE 5 MG/ML
10 INJECTION INTRAMUSCULAR; INTRAVENOUS ONCE
Status: CANCELLED | OUTPATIENT
Start: 2021-12-08 | End: 2021-11-04

## 2021-11-18 ENCOUNTER — LAB (OUTPATIENT)
Dept: LAB | Facility: HOSPITAL | Age: 41
End: 2021-11-18

## 2021-11-18 ENCOUNTER — CONSULT (OUTPATIENT)
Dept: BARIATRICS/WEIGHT MGMT | Facility: CLINIC | Age: 41
End: 2021-11-18

## 2021-11-18 VITALS
RESPIRATION RATE: 18 BRPM | SYSTOLIC BLOOD PRESSURE: 159 MMHG | BODY MASS INDEX: 46.65 KG/M2 | HEIGHT: 69 IN | WEIGHT: 315 LBS | TEMPERATURE: 97.3 F | DIASTOLIC BLOOD PRESSURE: 105 MMHG | HEART RATE: 87 BPM

## 2021-11-18 DIAGNOSIS — R53.82 CHRONIC FATIGUE: ICD-10-CM

## 2021-11-18 DIAGNOSIS — E66.01 CLASS 3 SEVERE OBESITY DUE TO EXCESS CALORIES WITH SERIOUS COMORBIDITY AND BODY MASS INDEX (BMI) OF 50.0 TO 59.9 IN ADULT (HCC): Primary | ICD-10-CM

## 2021-11-18 DIAGNOSIS — K21.00 GASTROESOPHAGEAL REFLUX DISEASE WITH ESOPHAGITIS WITHOUT HEMORRHAGE: ICD-10-CM

## 2021-11-18 DIAGNOSIS — E66.9 DIABETES MELLITUS TYPE 2 IN OBESE (HCC): ICD-10-CM

## 2021-11-18 DIAGNOSIS — E11.69 DIABETES MELLITUS TYPE 2 IN OBESE (HCC): ICD-10-CM

## 2021-11-18 DIAGNOSIS — M25.50 MULTIPLE JOINT PAIN: ICD-10-CM

## 2021-11-18 DIAGNOSIS — E66.01 CLASS 3 SEVERE OBESITY DUE TO EXCESS CALORIES WITH SERIOUS COMORBIDITY AND BODY MASS INDEX (BMI) OF 50.0 TO 59.9 IN ADULT (HCC): ICD-10-CM

## 2021-11-18 DIAGNOSIS — I10 ESSENTIAL HYPERTENSION: ICD-10-CM

## 2021-11-18 DIAGNOSIS — G47.33 OSA (OBSTRUCTIVE SLEEP APNEA): ICD-10-CM

## 2021-11-18 PROBLEM — J96.01 ACUTE RESPIRATORY FAILURE WITH HYPOXIA: Status: RESOLVED | Noted: 2021-01-27 | Resolved: 2021-11-18

## 2021-11-18 LAB
ALBUMIN SERPL-MCNC: 4.5 G/DL (ref 3.5–5.2)
ALBUMIN/GLOB SERPL: 1.3 G/DL
ALP SERPL-CCNC: 86 U/L (ref 39–117)
ALT SERPL W P-5'-P-CCNC: 34 U/L (ref 1–41)
ANION GAP SERPL CALCULATED.3IONS-SCNC: 11.3 MMOL/L (ref 5–15)
AST SERPL-CCNC: 23 U/L (ref 1–40)
BILIRUB SERPL-MCNC: 0.2 MG/DL (ref 0–1.2)
BUN SERPL-MCNC: 20 MG/DL (ref 6–20)
BUN/CREAT SERPL: 15.5 (ref 7–25)
CALCIUM SPEC-SCNC: 9.3 MG/DL (ref 8.6–10.5)
CHLORIDE SERPL-SCNC: 101 MMOL/L (ref 98–107)
CO2 SERPL-SCNC: 27.7 MMOL/L (ref 22–29)
CREAT SERPL-MCNC: 1.29 MG/DL (ref 0.76–1.27)
DEPRECATED RDW RBC AUTO: 46 FL (ref 37–54)
ERYTHROCYTE [DISTWIDTH] IN BLOOD BY AUTOMATED COUNT: 13.8 % (ref 12.3–15.4)
GFR SERPL CREATININE-BSD FRML MDRD: 74 ML/MIN/1.73
GLOBULIN UR ELPH-MCNC: 3.4 GM/DL
GLUCOSE SERPL-MCNC: 183 MG/DL (ref 65–99)
HCT VFR BLD AUTO: 49.7 % (ref 37.5–51)
HGB BLD-MCNC: 16.1 G/DL (ref 13–17.7)
MCH RBC QN AUTO: 29.3 PG (ref 26.6–33)
MCHC RBC AUTO-ENTMCNC: 32.4 G/DL (ref 31.5–35.7)
MCV RBC AUTO: 90.4 FL (ref 79–97)
PLATELET # BLD AUTO: 321 10*3/MM3 (ref 140–450)
PMV BLD AUTO: 10.4 FL (ref 6–12)
POTASSIUM SERPL-SCNC: 4 MMOL/L (ref 3.5–5.2)
PROT SERPL-MCNC: 7.9 G/DL (ref 6–8.5)
RBC # BLD AUTO: 5.5 10*6/MM3 (ref 4.14–5.8)
SODIUM SERPL-SCNC: 140 MMOL/L (ref 136–145)
WBC NRBC COR # BLD: 9.02 10*3/MM3 (ref 3.4–10.8)

## 2021-11-18 PROCEDURE — 85027 COMPLETE CBC AUTOMATED: CPT

## 2021-11-18 PROCEDURE — 36415 COLL VENOUS BLD VENIPUNCTURE: CPT

## 2021-11-18 PROCEDURE — 99215 OFFICE O/P EST HI 40 MIN: CPT | Performed by: SURGERY

## 2021-11-18 PROCEDURE — 80053 COMPREHEN METABOLIC PANEL: CPT

## 2021-11-18 RX ORDER — LOSARTAN POTASSIUM 50 MG/1
50 TABLET ORAL DAILY
COMMUNITY
Start: 2021-06-21 | End: 2021-11-18

## 2021-11-18 RX ORDER — AMLODIPINE BESYLATE AND ATORVASTATIN CALCIUM 10; 40 MG/1; MG/1
1 TABLET, FILM COATED ORAL DAILY
COMMUNITY
Start: 2021-11-15 | End: 2021-11-18

## 2021-11-18 RX ORDER — PEN NEEDLE, DIABETIC 31 GX5/16"
NEEDLE, DISPOSABLE MISCELLANEOUS
COMMUNITY
Start: 2021-07-12 | End: 2022-01-11

## 2021-11-18 RX ORDER — CHLORTHALIDONE 25 MG/1
1 TABLET ORAL DAILY
COMMUNITY
Start: 2021-07-15 | End: 2021-12-06

## 2021-11-18 RX ORDER — URSODIOL 300 MG/1
300 CAPSULE ORAL 2 TIMES DAILY
Qty: 60 CAPSULE | Refills: 5 | Status: SHIPPED | OUTPATIENT
Start: 2021-11-18 | End: 2022-06-23

## 2021-11-18 RX ORDER — INSULIN GLARGINE 100 [IU]/ML
20 INJECTION, SOLUTION SUBCUTANEOUS NIGHTLY
COMMUNITY
Start: 2021-11-15 | End: 2022-01-11

## 2021-11-18 RX ORDER — CARVEDILOL 25 MG/1
1 TABLET ORAL 2 TIMES DAILY
COMMUNITY
Start: 2021-11-02

## 2021-11-18 NOTE — H&P
Bariatric Consult:  Referred by Terra Gomez APRN    Rula Bardales is here today for consult on Consult (sleeve consultation)      History of Present Illness:     Rula Bardales is a 41 y.o. male with morbid obesity with co-morbidities including sleep apnea, hypertension, back pain, knee pain and GERD who presents for surgical consultation for the above procedure. Rula has completed the initial intake visit and has been examined by our nurse practitioner, dietician, psychologist and underwent the extensive educational teaching process under the guidance of our bariatric coordinator and myself. Rula also has seen the educational video JOESPH on the surgical procedure if available. Rula attended today more educational teaching from our bariatric coordinator and myself. Rula has had an extensive medical workup including a visit with their primary care physician, EKG, chest radiograph, blood work, EGD or UGI and possibly further testing. These have been reviewed by me and discussed with the patient. Rula is now ready to proceed with surgery. Rula presently denies nausea, vomiting, fever, chills, chest pain, shortness of air, melena, hematochezia, hemetemesis, dysuria, frequency, hematuria, jaundice or abdominal pain.       Past Medical History:   Diagnosis Date   • Arthritis    • Diabetes mellitus (HCC)    • History of 2019 novel coronavirus disease (COVID-19) 01/26/2021    PLACED IN COMA   • Hyperlipidemia    • Hypertension    • Right knee pain    • Sleep apnea     CPAP       Encounter Diagnoses   Name Primary?   • Class 3 severe obesity due to excess calories with serious comorbidity and body mass index (BMI) of 50.0 to 59.9 in adult ([facility]) Yes   • Diabetes mellitus type 2 in obese ([facility])    • Essential hypertension    • Multiple joint pain    • AMIRA (obstructive sleep apnea)    • Chronic fatigue    • Gastroesophageal reflux disease with esophagitis without hemorrhage        Past Surgical  History:   Procedure Laterality Date   • ENDOSCOPY N/A 7/27/2021    Procedure: ESOPHAGOGASTRODUODENOSCOPY WITH BIOPSY;  Surgeon: Farzad Polo Jr., MD;  Location: [facility] ENDOSCOPY;  Service: General;  Laterality: N/A;  PRE- DYSPEPSIA  POST-GASTRITIS, ESOPHAGITIS   • KNEE SURGERY Right 2019    athroscope-MENISCUS REPAIR       Patient Active Problem List   Diagnosis   • Chronic fatigue   • AMIRA (obstructive sleep apnea)   • Snoring   • Multiple joint pain   • Diabetes mellitus type 2 in obese (HCC)   • Essential hypertension   • Idiopathic hypotension   • Acute renal failure (HCC)   • Shortness of breath   • Gastroesophageal reflux disease with esophagitis without hemorrhage   • Class 3 severe obesity due to excess calories with serious comorbidity and body mass index (BMI) of 50.0 to 59.9 in adult (HCC)       Allergies   Allergen Reactions   • Ace Inhibitors Other (See Comments) and Cough     cough         Current Outpatient Medications:   •  carvedilol (COREG) 25 MG tablet, Take 1 tablet by mouth 2 (Two) Times a Day., Disp: , Rfl:   •  chlorthalidone (HYGROTON) 25 MG tablet, Take 1 tablet by mouth Daily., Disp: , Rfl:   •  Insulin Glargine (BASAGLAR KWIKPEN) 100 UNIT/ML injection pen, ADMINISTER 20 UNITS UNDER THE SKIN AT BEDTIME, Disp: , Rfl:   •  Insulin Pen Needle (B-D ULTRAFINE III SHORT PEN) 31G X 8 MM misc, USE AS DIRECTED EVERY DAY FOR INSULIN INJECTIONS, Disp: , Rfl:   •  metFORMIN (GLUCOPHAGE) 1000 MG tablet, Take 1,000 mg by mouth 2 (Two) Times a Day With Meals., Disp: , Rfl:   •  enoxaparin (Lovenox) 40 MG/0.4ML solution syringe, Inject 0.4 mL under the skin into the appropriate area as directed Every 12 (Twelve) Hours for 21 days. Start after surgery unless instructed otherwise, Disp: 42 each, Rfl: 0  •  folic acid-vit B6-vit B12 (FOLBEE) 2.5-25-1 MG tablet tablet, Take 1 tablet by mouth Daily., Disp: 40 tablet, Rfl: 0  •  ursodiol (Actigall) 300 MG capsule, Take 1 capsule by mouth 2 (Two) Times a  Day., Disp: 60 capsule, Rfl: 5    Social History     Socioeconomic History   • Marital status:    Tobacco Use   • Smoking status: Former Smoker     Years: 15.00     Types: Cigars   • Smokeless tobacco: Never Used   • Tobacco comment: QUIT 5 YR AGO   Vaping Use   • Vaping Use: Never used   Substance and Sexual Activity   • Alcohol use: Yes     Comment: OCCASIONALLY   • Drug use: No   • Sexual activity: Defer       Family History   Problem Relation Age of Onset   • Diabetes Mother    • Hypertension Mother    • Stroke Mother    • Hypertension Father    • Stroke Father    • Heart attack Father    • Cancer Maternal Grandmother    • Malig Hyperthermia Neg Hx        Review of Systems:  Review of Systems   Constitutional: Positive for fatigue.   Musculoskeletal: Positive for arthralgias and back pain.   All other systems reviewed and are negative.        Physical Exam:    Vital Signs:  Weight: (!) 168 kg (371 lb)   Body mass index is 54.33 kg/m².  Temp: 97.3 °F (36.3 °C)   Heart Rate: 87   BP: (!) 159/105       Physical Exam  Vitals reviewed.   HENT:      Head: Normocephalic and atraumatic.      Mouth/Throat:      Mouth: Mucous membranes are moist.      Pharynx: Oropharynx is clear.   Eyes:      General: No scleral icterus.     Extraocular Movements: Extraocular movements intact.      Conjunctiva/sclera: Conjunctivae normal.      Pupils: Pupils are equal, round, and reactive to light.   Neck:      Thyroid: No thyromegaly.   Cardiovascular:      Rate and Rhythm: Normal rate.   Pulmonary:      Effort: Pulmonary effort is normal. No respiratory distress.      Breath sounds: Normal breath sounds. No stridor. No wheezing or rhonchi.   Abdominal:      General: Bowel sounds are normal.      Palpations: Abdomen is soft.      Tenderness: There is no abdominal tenderness. There is no right CVA tenderness, left CVA tenderness, guarding or rebound.      Hernia: No hernia is present.   Musculoskeletal:         General: Normal  range of motion.      Cervical back: Normal range of motion and neck supple.   Lymphadenopathy:      Cervical: No cervical adenopathy.   Skin:     General: Skin is warm and dry.      Findings: No erythema.   Neurological:      Mental Status: He is alert and oriented to person, place, and time.   Psychiatric:         Mood and Affect: Mood normal.         Behavior: Behavior normal.         Thought Content: Thought content normal.         Judgment: Judgment normal.           Assessment:    Rula Bardales is a 41 y.o. year old male with medically complicated severe obesity with a BMI of Body mass index is 54.33 kg/m². and multiple co-morbidities listed in the encounter diagnosis.    I think he is an appropriate candidate for this surgery, and is ready to proceed.      Plan/Discussion/Summary:  Questionable hiatal hernia per me.  LA grade a esophagitis negative for Albert's.  Patient did not take PPI.  Started on Protonix.  H. pylori negative    The patient has returned to the office for a surgical consultation and has requested to proceed with a laparoscopic gastric sleeve.  I have had the opportunity to obtain a history, examine the patient and review the patient's chart.    The patient understands that surgery is a tool and that weight loss is not guaranteed but only seen in the context of appropriate use, regular follow up, exercise and making appropriate food choices.     I personally discussed the potential complications of the laparoscopic gastric sleeve with this patient.  The patient is well aware of potential complications of the surgery that include but not limited to bleeding, infections, deep vein thrombosis, pulmonary embolism, pulmonary complications such as pneumonia, cardiac event, hernias, small bowel obstruction, damage to the spleen or other organs, bowel injury, disfiguring scars, failure to lose weight, need for additional surgery, conversion to an open procedure and death.  The patient is also  aware of complications which apply in particular to the gastric sleeve and can include but not limited to the leakage of gastric contents at the staple line, the development of an intra-abdominal abscess, gastroesophageal reflux disease, Albert's esophagus, ulcers, vitamin/mineral deficiencies, strictures, and the possibility of converting this procedure to a Ray-en-Y gastric bypass. The patient also understands the possibility of requiring an acid reducer medication for the rest of their life.    The risks, benefits, potential complications and alternative therapies were discussed at great length as outlined in our extensive consent forms, online consent and educational teaching processes.    The patient has confirmed the participation in the programs extensive educational activities.    All questions and concerns were answered to patient's satisfaction.  The patient now wishes to proceed with surgery.     The patient has agreed to a postoperative course of anitcoagulant therapy.      I instructed patient to start on a H2 blocker or proton pump inhibitor if not already on one of these medications.    I explained in detail the procedures that we perform.  All of these procedures have a chance to convert to open if any technical challenges or complications do occur.  Bariatric surgery is not cosmetic surgery but rather a tool to help a patient make a life-long commitment lifestyle change including diet, exercise, behavior changes, and taking supplemental vitamins and minerals.    Problems after surgery may require more operations to correct them.    The risks, benefits, alternatives, and potential complications of all of the procedures were explained in detail including, but not limited to death, anesthesia and medication adverse effect, deep venous thrombosis, pulmonary embolism, trocar site/incisional hernia, wound infection, abdominal infection, bleeding, failure to lose weight, gain weight, a change in body  image, metabolic complications with vitamin deficiences and anemia.    Weight loss expectations were discussed with the patient in detail. The weight loss operations most commonly performed are the sleeve gastrectomy and the Ray-en-Y gastric bypass. These operations result in weight losses up to approximately 25-35% of initial body weight 12 to 24 months after surgery with the gastric bypass usually the higher percent of weight loss but depends on patient using the tool.    For the gastric bypass and loop duodenal switch (NICOLAS-S) the risks include but not limited to the following early complications:  Anastomotic leak/peritonitis, Ray/Alimentary/biliopancreatic limb obstruction, severe & minor wound infection/seroma, and nausea/vomiting.  Late complications can include but are not limited to malnutrition, vitamin deficiencies, frequent loose stools,  stomal stenosis, marginal ulcer, bowel obstruction, intussusception, internal, and incisional hernia.    Regarding the gastric sleeve, there is less long-term outcome data and higher risk of dysphagia and reflux leading to possible Albert's esophagus compared to a gastric bypass, as well as risk of internal visceral/organ injury, splenectomy, bleeding, infection, leak (which could require further intervention possible conversion to Ray-en-Y gastric bypass), stenosis and possibility of regaining weight.    Rula was counseled regarding diagnostic results, instructions for management, risk factor reductions, prognosis, patient and family education, impressions, risks and benefits of treatment options and importance of compliance with treatment. Total time of the encounter was over 45 minutes counseling the patient regarding the procedure as above and reviewing as well as ordering labs, medications and the procedure.  The chart was also reviewed prior to seeing the patient reviewing previous testing, studies and labs.    Giacomo Veliz   As part of this patient's  treatment plan I am prescribing controlled substances. The patient has been made aware of appropriate use of such medications, including potential risk of somnolence, limited ability to drive and /or work safely, and potential for dependence or overdose. It has also been made clear that these medications are for use by this patient only, without concomitant use of alcohol or other substances unless prescribed.    Rula has completed prescribing agreement detailing terms of continued prescribing of controlled substances, including monitoring ALLISON reports, urine drug screening, and pill counts if necessary. Rula is aware that inappropriate use will result in cessation of prescribing such medications.    ALLISON report has been reviewed      History and physical exam exhibit continued safe and appropriate use of controlled substances.      Rula understands the surgical procedures and the different surgical options that are available.  He understands the lifestyle changes that are required after surgery and has agreed to follow the guidelines outlined in the weight management program.  He also expressed understanding of the risks involved and had all of male questions answered and desires to proceed.      Farzad Polo MD  11/18/2021

## 2021-11-18 NOTE — PATIENT INSTRUCTIONS
Bariatric Manual    You were provided a manual specific to the procedure that you have chosen.  Please refer to that with any questions or call the office at [phone]

## 2021-12-06 ENCOUNTER — PRE-ADMISSION TESTING (OUTPATIENT)
Dept: PREADMISSION TESTING | Facility: HOSPITAL | Age: 41
End: 2021-12-06

## 2021-12-06 VITALS
DIASTOLIC BLOOD PRESSURE: 82 MMHG | BODY MASS INDEX: 53.23 KG/M2 | HEIGHT: 70 IN | SYSTOLIC BLOOD PRESSURE: 154 MMHG | OXYGEN SATURATION: 97 % | HEART RATE: 90 BPM | RESPIRATION RATE: 18 BRPM | TEMPERATURE: 98.5 F

## 2021-12-06 DIAGNOSIS — E66.01 CLASS 3 SEVERE OBESITY DUE TO EXCESS CALORIES WITH SERIOUS COMORBIDITY AND BODY MASS INDEX (BMI) OF 50.0 TO 59.9 IN ADULT (HCC): ICD-10-CM

## 2021-12-06 LAB — SARS-COV-2 ORF1AB RESP QL NAA+PROBE: NOT DETECTED

## 2021-12-06 PROCEDURE — U0004 COV-19 TEST NON-CDC HGH THRU: HCPCS

## 2021-12-06 PROCEDURE — C9803 HOPD COVID-19 SPEC COLLECT: HCPCS

## 2021-12-06 RX ORDER — CHLORHEXIDINE GLUCONATE 500 MG/1
CLOTH TOPICAL
COMMUNITY
End: 2021-12-09 | Stop reason: HOSPADM

## 2021-12-06 NOTE — DISCHARGE INSTRUCTIONS
Take only the following medications the morning of surgery:     CARVEDILOL    ARRIVAL TIME IS 0600 ON 12/08/2021 FOR SURGERY      BRING CPAP MACHINE    Do not take Bariatric Vitamins, Folic Acid, Actigall (if applicable) or Lovenox Injections (if applicable) the morning of surgery.  If you have a history of blood clots or have a BMI greater than 50, Dr. Polo may order Lovenox for after surgery. Do not take Lovenox blood thinner before surgery.      General Instructions:   • Drink one 20 ounce Gatorade G2 the evening before surgery.  Nothing red in color.  • Do not eat solid food after midnight the night before surgery.    • The morning of surgery have another 20 ounce Gatorade G2.  Again, nothing red in color.  Your drink must be completed 2 hours before your arrival time.   • Patients who avoid smoking, chewing tobacco and alcohol for 4 weeks prior to surgery have a reduced risk of post-operative complications.  Quit smoking as many days before surgery as you can.  • Do not smoke, use chewing tobacco or drink alcohol the day of surgery.   • Bring any papers given to you in the doctor's office.  Wear clean comfortable clothes.  • Do not wear contact lenses, false eyelashes or make-up.  Bring a case for your glasses.   • Bring crutches or walker if applicable.  • Remove all piercings.  Leave jewelry and any other valuables at home.  • Remove fingernail polish, gel overlays or any artificial nails.  • Hair extensions with metal clips must be removed prior to surgery.  • The Pre-Admission Testing nurse will instruct you to bring medications if unable to obtain an accurate list in Pre-Admission Testing.        Preventing a Surgical Site Infection:  • For 2 to 3 days before surgery, avoid shaving with a razor because the razor can irritate skin and make it easier to develop an infection.    • Any areas of open skin can increase the risk of a post-operative wound infection by allowing bacteria to enter and travel  throughout the body.  Notify your surgeon if you have any skin wounds / rashes even if it is not near the expected surgical site.  The area will need assessed to determine if surgery should be delayed until it is healed.  • 2 days prior to surgery, take a shower using a fresh bar of anti-bacterial soap (such as Dial).  Use a clean washcloth and dry with a clean towel.    • The day prior to surgery, take a shower using a fresh bar of anti-bacterial soap (such as Dial).  Use a clean washcloth and dry with a clean towel.  Sleep in a clean bed with clean clothing.  Do not allow pets to sleep with you.  • The morning of surgery shower using a fresh bar of anti-bacterial soap (such as Dial).  Use a clean washcloth and dry with a clean towel.  Follow the Chlorhexidine instructions below.    CHLORHEXIDINE CLOTH INSTRUCTIONS  The morning of surgery follow these instructions using the Chlorhexidine cloths you've been given.  These steps reduce bacteria on the body.  Do not use the cloths near your eyes, ears mouth, genitalia or on open wounds.  Throw the cloths away after use but do not try to flush them down a toilet.    • Open and remove one cloth at a time from the package.    • Leave the cloth unfolded and begin the bathing.  • Massage the skin with the cloths using gentle pressure to remove bacteria.  Do not scrub harshly.   • Follow the steps below with one 2% CHG cloth per area (6 total cloths).  • One cloth for neck, shoulders and chest.  • One cloth for both arms, hands, fingers and underarms (do underarms last).  • One cloth for the abdomen followed by groin.  • One cloth for right leg and foot including between the toes.  • One cloth for left leg and foot including between the toes.  • The last cloth is to be used for the back of the neck, back and buttocks.    Allow the CHG to air dry 3 minutes on the skin which will give it time to work and decrease the chance of irritation.  The skin may feel sticky until it is  dry.  Do not rinse with water or any other liquid or you will lose the beneficial effects of the CHG.  If mild skin irritation occurs, do rinse the skin to remove the CHG.  Report this to the nurse at time of admission.  Do not apply lotions, creams, ointments, deodorants or perfumes after using the clothes. Dress in clean clothes before coming to the hospital.    • Ask your surgeon if you will be receiving antibiotics prior to surgery.  • Make sure you, your family, and all healthcare providers clean their hands with soap and water or an alcohol based hand  before caring for you or your wound.      Day of surgery:  Your arrival time is approximately two hours before your scheduled surgery time.  Upon arrival, a Pre-op nurse and Anesthesiologist will review your health history, obtain vital signs, and answer questions you may have.  A Pre-op nurse will start an IV and you may receive medication in preparation for surgery, including something to help you relax.  If applicable, we do ask that you have your C-PAP/BI-PAP machine available. It can be utilized the night of surgery.     Please be aware that surgery does come with discomfort.  We want to make every effort to control your discomfort so please discuss any uncontrolled symptoms with your nurse.   Your doctor will most likely have prescribed pain medications.      If you are going home after surgery you will receive individualized written care instructions before being discharged.  A responsible adult must drive you to and from the hospital on the day of your surgery and stay with you for 24 hours.  Discharge prescriptions can be filled by the hospital pharmacy during regular pharmacy hours.  If you are having surgery late in the day/evening your prescription may be e-prescribed to your pharmacy.  Please verify your pharmacy hours or chose a 24 hour pharmacy to avoid not having access to your prescription because your pharmacy has closed for the  day.    If you are staying overnight following surgery, you will be transported to your hospital room following the recovery period.  [facility] has all private rooms.    If you have any questions please call Pre-Admission Testing at [phone].  Deductibles and co-payments are collected on the day of service. Please be prepared to pay the required co-pay, deductible or deposit on the day of service as defined by your plan.    Patient Education for Self-Quarantine Process    • Following your COVID testing, we strongly recommend that you wear a mask when you are with other people and practice social distancing.   • Limit your activities to only required outings.  • Wash your hands with soap and water frequently for at least 20 seconds.   • Avoid touching your eyes, nose and mouth with unwashed hands.  • Do not share anything - utensils, drinking glasses, food from the same bowl.   • Sanitize household surfaces daily. Include all high touch areas (door handles, light switches, phones, countertops, etc.)    Call your surgeon immediately if you experience any of the following symptoms:  • Sore Throat  • Shortness of Breath or difficulty breathing  • Cough  • Chills  • Body soreness or muscle pain  • Headache  • Fever  • New loss of taste or smell  • Do not arrive for your surgery ill.  Your procedure will need to be rescheduled to another time.  You will need to call your physician before the day of surgery to avoid any unnecessary exposure to hospital staff as well as other patients.

## 2021-12-08 ENCOUNTER — HOSPITAL ENCOUNTER (INPATIENT)
Facility: HOSPITAL | Age: 41
LOS: 1 days | Discharge: HOME | End: 2021-12-09
Attending: SURGERY | Admitting: SURGERY

## 2021-12-08 ENCOUNTER — ANESTHESIA EVENT (OUTPATIENT)
Dept: PERIOP | Facility: HOSPITAL | Age: 41
End: 2021-12-08

## 2021-12-08 ENCOUNTER — ANESTHESIA (OUTPATIENT)
Dept: PERIOP | Facility: HOSPITAL | Age: 41
End: 2021-12-08

## 2021-12-08 DIAGNOSIS — E66.01 CLASS 3 SEVERE OBESITY DUE TO EXCESS CALORIES WITH SERIOUS COMORBIDITY AND BODY MASS INDEX (BMI) OF 50.0 TO 59.9 IN ADULT (HCC): ICD-10-CM

## 2021-12-08 DIAGNOSIS — Z98.84 S/P LAPAROSCOPIC SLEEVE GASTRECTOMY: Primary | ICD-10-CM

## 2021-12-08 LAB
GLUCOSE BLDC GLUCOMTR-MCNC: 181 MG/DL (ref 70–130)
GLUCOSE BLDC GLUCOMTR-MCNC: 190 MG/DL (ref 70–130)
GLUCOSE BLDC GLUCOMTR-MCNC: 194 MG/DL (ref 70–130)
GLUCOSE BLDC GLUCOMTR-MCNC: 198 MG/DL (ref 70–130)

## 2021-12-08 PROCEDURE — 25010000002 LIDOCAINE PER 10 MG: Performed by: NURSE ANESTHETIST, CERTIFIED REGISTERED

## 2021-12-08 PROCEDURE — 63710000001 INSULIN LISPRO (HUMAN) PER 5 UNITS: Performed by: SURGERY

## 2021-12-08 PROCEDURE — 25010000002 ONDANSETRON PER 1 MG: Performed by: NURSE ANESTHETIST, CERTIFIED REGISTERED

## 2021-12-08 PROCEDURE — 25010000002 MAGNESIUM SULFATE PER 500 MG OF MAGNESIUM: Performed by: NURSE ANESTHETIST, CERTIFIED REGISTERED

## 2021-12-08 PROCEDURE — 25010000002 FENTANYL CITRATE (PF) 50 MCG/ML SOLUTION: Performed by: NURSE ANESTHETIST, CERTIFIED REGISTERED

## 2021-12-08 PROCEDURE — C9290 INJ, BUPIVACAINE LIPOSOME: HCPCS | Performed by: SURGERY

## 2021-12-08 PROCEDURE — 0DB64Z3 EXCISION OF STOMACH, PERCUTANEOUS ENDOSCOPIC APPROACH, VERTICAL: ICD-10-PCS | Performed by: SURGERY

## 2021-12-08 PROCEDURE — 0 BUPIVACAINE LIPOSOME 1.3 % SUSPENSION: Performed by: SURGERY

## 2021-12-08 PROCEDURE — 25010000002 METOCLOPRAMIDE PER 10 MG: Performed by: SURGERY

## 2021-12-08 PROCEDURE — 88307 TISSUE EXAM BY PATHOLOGIST: CPT | Performed by: SURGERY

## 2021-12-08 PROCEDURE — 25010000002 AMISULPRIDE (ANTIEMETIC) 5 MG/2ML SOLUTION: Performed by: SURGERY

## 2021-12-08 PROCEDURE — 82962 GLUCOSE BLOOD TEST: CPT

## 2021-12-08 PROCEDURE — C1889 IMPLANT/INSERT DEVICE, NOC: HCPCS | Performed by: SURGERY

## 2021-12-08 PROCEDURE — 43775 LAP SLEEVE GASTRECTOMY: CPT | Performed by: NURSE PRACTITIONER

## 2021-12-08 PROCEDURE — 43775 LAP SLEEVE GASTRECTOMY: CPT | Performed by: SURGERY

## 2021-12-08 PROCEDURE — 94799 UNLISTED PULMONARY SVC/PX: CPT

## 2021-12-08 PROCEDURE — 25010000002 DEXAMETHASONE PER 1 MG: Performed by: NURSE ANESTHETIST, CERTIFIED REGISTERED

## 2021-12-08 PROCEDURE — 25010000002 MIDAZOLAM PER 1 MG: Performed by: STUDENT IN AN ORGANIZED HEALTH CARE EDUCATION/TRAINING PROGRAM

## 2021-12-08 PROCEDURE — C9399 UNCLASSIFIED DRUGS OR BIOLOG: HCPCS | Performed by: SURGERY

## 2021-12-08 PROCEDURE — 25010000002 ENOXAPARIN PER 10 MG: Performed by: SURGERY

## 2021-12-08 PROCEDURE — 25010000002 PROPOFOL 10 MG/ML EMULSION: Performed by: NURSE ANESTHETIST, CERTIFIED REGISTERED

## 2021-12-08 PROCEDURE — 25010000002 CEFAZOLIN PER 500 MG

## 2021-12-08 DEVICE — IMPLANTABLE DEVICE
Type: IMPLANTABLE DEVICE | Site: STOMACH | Status: FUNCTIONAL
Brand: TITAN SGS STANDARD GASTRIC STAPLER

## 2021-12-08 DEVICE — SEALANT WND FIBRIN TISSEEL PREFIL/SYR/PRIMAFZ 4ML: Type: IMPLANTABLE DEVICE | Site: STOMACH | Status: FUNCTIONAL

## 2021-12-08 RX ORDER — DIPHENHYDRAMINE HCL 25 MG
25 CAPSULE ORAL
Status: DISCONTINUED | OUTPATIENT
Start: 2021-12-08 | End: 2021-12-08 | Stop reason: HOSPADM

## 2021-12-08 RX ORDER — NALOXONE HCL 0.4 MG/ML
0.2 VIAL (ML) INJECTION AS NEEDED
Status: DISCONTINUED | OUTPATIENT
Start: 2021-12-08 | End: 2021-12-08 | Stop reason: HOSPADM

## 2021-12-08 RX ORDER — NALOXONE HCL 0.4 MG/ML
0.1 VIAL (ML) INJECTION
Status: DISCONTINUED | OUTPATIENT
Start: 2021-12-08 | End: 2021-12-09 | Stop reason: HOSPADM

## 2021-12-08 RX ORDER — GABAPENTIN 300 MG/1
300 CAPSULE ORAL EVERY 8 HOURS
Status: DISCONTINUED | OUTPATIENT
Start: 2021-12-08 | End: 2021-12-09 | Stop reason: HOSPADM

## 2021-12-08 RX ORDER — PROMETHAZINE HYDROCHLORIDE 25 MG/1
12.5 TABLET ORAL ONCE
Status: COMPLETED | OUTPATIENT
Start: 2021-12-08 | End: 2021-12-08

## 2021-12-08 RX ORDER — ONDANSETRON 2 MG/ML
4 INJECTION INTRAMUSCULAR; INTRAVENOUS EVERY 4 HOURS PRN
Status: DISCONTINUED | OUTPATIENT
Start: 2021-12-08 | End: 2021-12-09 | Stop reason: HOSPADM

## 2021-12-08 RX ORDER — CHLORHEXIDINE GLUCONATE 0.12 MG/ML
15 RINSE ORAL SEE ADMIN INSTRUCTIONS
Status: COMPLETED | OUTPATIENT
Start: 2021-12-08 | End: 2021-12-08

## 2021-12-08 RX ORDER — ACETAMINOPHEN 160 MG/5ML
975 SOLUTION ORAL ONCE
Status: COMPLETED | OUTPATIENT
Start: 2021-12-08 | End: 2021-12-08

## 2021-12-08 RX ORDER — PROMETHAZINE HYDROCHLORIDE 25 MG/1
25 SUPPOSITORY RECTAL ONCE
Status: COMPLETED | OUTPATIENT
Start: 2021-12-08 | End: 2021-12-08

## 2021-12-08 RX ORDER — ACETAMINOPHEN 160 MG/5ML
1000 SOLUTION ORAL EVERY 6 HOURS
Status: DISCONTINUED | OUTPATIENT
Start: 2021-12-08 | End: 2021-12-09 | Stop reason: HOSPADM

## 2021-12-08 RX ORDER — GABAPENTIN 250 MG/5ML
300 SOLUTION ORAL EVERY 8 HOURS
Status: DISCONTINUED | OUTPATIENT
Start: 2021-12-08 | End: 2021-12-09 | Stop reason: HOSPADM

## 2021-12-08 RX ORDER — SODIUM CHLORIDE 0.9 % (FLUSH) 0.9 %
3 SYRINGE (ML) INJECTION EVERY 12 HOURS SCHEDULED
Status: DISCONTINUED | OUTPATIENT
Start: 2021-12-08 | End: 2021-12-09 | Stop reason: HOSPADM

## 2021-12-08 RX ORDER — LIDOCAINE HYDROCHLORIDE 20 MG/ML
INJECTION, SOLUTION INFILTRATION; PERINEURAL AS NEEDED
Status: DISCONTINUED | OUTPATIENT
Start: 2021-12-08 | End: 2021-12-08 | Stop reason: SURG

## 2021-12-08 RX ORDER — CYANOCOBALAMIN 1000 UG/ML
1000 INJECTION, SOLUTION INTRAMUSCULAR; SUBCUTANEOUS ONCE
Status: COMPLETED | OUTPATIENT
Start: 2021-12-09 | End: 2021-12-09

## 2021-12-08 RX ORDER — DIPHENHYDRAMINE HYDROCHLORIDE 50 MG/ML
25 INJECTION INTRAMUSCULAR; INTRAVENOUS EVERY 4 HOURS PRN
Status: DISCONTINUED | OUTPATIENT
Start: 2021-12-08 | End: 2021-12-09 | Stop reason: HOSPADM

## 2021-12-08 RX ORDER — LIDOCAINE HYDROCHLORIDE ANHYDROUS AND DEXTROSE MONOHYDRATE 5; 400 G/100ML; MG/100ML
INJECTION, SOLUTION INTRAVENOUS CONTINUOUS PRN
Status: DISCONTINUED | OUTPATIENT
Start: 2021-12-08 | End: 2021-12-08 | Stop reason: SURG

## 2021-12-08 RX ORDER — LORAZEPAM 2 MG/ML
1 INJECTION INTRAMUSCULAR EVERY 12 HOURS PRN
Status: DISCONTINUED | OUTPATIENT
Start: 2021-12-08 | End: 2021-12-09 | Stop reason: HOSPADM

## 2021-12-08 RX ORDER — PROMETHAZINE HYDROCHLORIDE 12.5 MG/1
12.5 SUPPOSITORY RECTAL EVERY 4 HOURS PRN
Status: DISCONTINUED | OUTPATIENT
Start: 2021-12-08 | End: 2021-12-09 | Stop reason: HOSPADM

## 2021-12-08 RX ORDER — HYDRALAZINE HYDROCHLORIDE 20 MG/ML
5 INJECTION INTRAMUSCULAR; INTRAVENOUS
Status: DISCONTINUED | OUTPATIENT
Start: 2021-12-08 | End: 2021-12-08 | Stop reason: HOSPADM

## 2021-12-08 RX ORDER — DIPHENHYDRAMINE HYDROCHLORIDE 50 MG/ML
12.5 INJECTION INTRAMUSCULAR; INTRAVENOUS
Status: DISCONTINUED | OUTPATIENT
Start: 2021-12-08 | End: 2021-12-08 | Stop reason: HOSPADM

## 2021-12-08 RX ORDER — PROMETHAZINE HYDROCHLORIDE 25 MG/1
25 TABLET ORAL ONCE AS NEEDED
Status: DISCONTINUED | OUTPATIENT
Start: 2021-12-08 | End: 2021-12-08 | Stop reason: HOSPADM

## 2021-12-08 RX ORDER — PANTOPRAZOLE SODIUM 40 MG/10ML
40 INJECTION, POWDER, LYOPHILIZED, FOR SOLUTION INTRAVENOUS ONCE
Status: COMPLETED | OUTPATIENT
Start: 2021-12-08 | End: 2021-12-08

## 2021-12-08 RX ORDER — FENTANYL CITRATE 50 UG/ML
INJECTION, SOLUTION INTRAMUSCULAR; INTRAVENOUS AS NEEDED
Status: DISCONTINUED | OUTPATIENT
Start: 2021-12-08 | End: 2021-12-08 | Stop reason: SURG

## 2021-12-08 RX ORDER — LABETALOL HYDROCHLORIDE 5 MG/ML
5 INJECTION, SOLUTION INTRAVENOUS
Status: DISCONTINUED | OUTPATIENT
Start: 2021-12-08 | End: 2021-12-08 | Stop reason: HOSPADM

## 2021-12-08 RX ORDER — SCOLOPAMINE TRANSDERMAL SYSTEM 1 MG/1
1 PATCH, EXTENDED RELEASE TRANSDERMAL CONTINUOUS
Status: DISCONTINUED | OUTPATIENT
Start: 2021-12-08 | End: 2021-12-09 | Stop reason: HOSPADM

## 2021-12-08 RX ORDER — HYDROCODONE BITARTRATE AND ACETAMINOPHEN 7.5; 325 MG/1; MG/1
1 TABLET ORAL ONCE AS NEEDED
Status: DISCONTINUED | OUTPATIENT
Start: 2021-12-08 | End: 2021-12-08 | Stop reason: HOSPADM

## 2021-12-08 RX ORDER — SODIUM CHLORIDE 0.9 % (FLUSH) 0.9 %
3-10 SYRINGE (ML) INJECTION AS NEEDED
Status: DISCONTINUED | OUTPATIENT
Start: 2021-12-08 | End: 2021-12-08 | Stop reason: HOSPADM

## 2021-12-08 RX ORDER — SODIUM CHLORIDE, SODIUM LACTATE, POTASSIUM CHLORIDE, CALCIUM CHLORIDE 600; 310; 30; 20 MG/100ML; MG/100ML; MG/100ML; MG/100ML
150 INJECTION, SOLUTION INTRAVENOUS CONTINUOUS
Status: DISCONTINUED | OUTPATIENT
Start: 2021-12-08 | End: 2021-12-09 | Stop reason: HOSPADM

## 2021-12-08 RX ORDER — CEFAZOLIN SODIUM IN 0.9 % NACL 3 G/100 ML
3 INTRAVENOUS SOLUTION, PIGGYBACK (ML) INTRAVENOUS
Status: COMPLETED | OUTPATIENT
Start: 2021-12-08 | End: 2021-12-08

## 2021-12-08 RX ORDER — ROCURONIUM BROMIDE 10 MG/ML
INJECTION, SOLUTION INTRAVENOUS AS NEEDED
Status: DISCONTINUED | OUTPATIENT
Start: 2021-12-08 | End: 2021-12-08 | Stop reason: SURG

## 2021-12-08 RX ORDER — SODIUM CHLORIDE, SODIUM LACTATE, POTASSIUM CHLORIDE, CALCIUM CHLORIDE 600; 310; 30; 20 MG/100ML; MG/100ML; MG/100ML; MG/100ML
100 INJECTION, SOLUTION INTRAVENOUS CONTINUOUS
Status: DISCONTINUED | OUTPATIENT
Start: 2021-12-08 | End: 2021-12-08 | Stop reason: HOSPADM

## 2021-12-08 RX ORDER — PROCHLORPERAZINE EDISYLATE 5 MG/ML
10 INJECTION INTRAMUSCULAR; INTRAVENOUS EVERY 6 HOURS PRN
Status: DISCONTINUED | OUTPATIENT
Start: 2021-12-08 | End: 2021-12-09 | Stop reason: HOSPADM

## 2021-12-08 RX ORDER — BUPIVACAINE HYDROCHLORIDE AND EPINEPHRINE 5; 5 MG/ML; UG/ML
INJECTION, SOLUTION EPIDURAL; INTRACAUDAL; PERINEURAL AS NEEDED
Status: DISCONTINUED | OUTPATIENT
Start: 2021-12-08 | End: 2021-12-08 | Stop reason: HOSPADM

## 2021-12-08 RX ORDER — MIRTAZAPINE 15 MG/1
15 TABLET, ORALLY DISINTEGRATING ORAL NIGHTLY
Status: DISCONTINUED | OUTPATIENT
Start: 2021-12-08 | End: 2021-12-09 | Stop reason: HOSPADM

## 2021-12-08 RX ORDER — ONDANSETRON 4 MG/1
4 TABLET, ORALLY DISINTEGRATING ORAL EVERY 4 HOURS PRN
Status: DISCONTINUED | OUTPATIENT
Start: 2021-12-08 | End: 2021-12-09 | Stop reason: HOSPADM

## 2021-12-08 RX ORDER — SODIUM CHLORIDE, SODIUM LACTATE, POTASSIUM CHLORIDE, CALCIUM CHLORIDE 600; 310; 30; 20 MG/100ML; MG/100ML; MG/100ML; MG/100ML
9 INJECTION, SOLUTION INTRAVENOUS CONTINUOUS
Status: DISCONTINUED | OUTPATIENT
Start: 2021-12-08 | End: 2021-12-08 | Stop reason: HOSPADM

## 2021-12-08 RX ORDER — ALBUTEROL SULFATE 2.5 MG/3ML
2.5 SOLUTION RESPIRATORY (INHALATION) EVERY 4 HOURS PRN
Status: DISCONTINUED | OUTPATIENT
Start: 2021-12-08 | End: 2021-12-09 | Stop reason: HOSPADM

## 2021-12-08 RX ORDER — GABAPENTIN 250 MG/5ML
300 SOLUTION ORAL ONCE
Status: COMPLETED | OUTPATIENT
Start: 2021-12-08 | End: 2021-12-08

## 2021-12-08 RX ORDER — CARVEDILOL 25 MG/1
25 TABLET ORAL 2 TIMES DAILY
Status: DISCONTINUED | OUTPATIENT
Start: 2021-12-08 | End: 2021-12-09 | Stop reason: HOSPADM

## 2021-12-08 RX ORDER — HYDROMORPHONE HYDROCHLORIDE 2 MG/1
2 TABLET ORAL EVERY 4 HOURS PRN
Status: DISCONTINUED | OUTPATIENT
Start: 2021-12-08 | End: 2021-12-09 | Stop reason: HOSPADM

## 2021-12-08 RX ORDER — LIDOCAINE HYDROCHLORIDE 10 MG/ML
0.5 INJECTION, SOLUTION EPIDURAL; INFILTRATION; INTRACAUDAL; PERINEURAL ONCE AS NEEDED
Status: DISCONTINUED | OUTPATIENT
Start: 2021-12-08 | End: 2021-12-08 | Stop reason: HOSPADM

## 2021-12-08 RX ORDER — MAGNESIUM SULFATE HEPTAHYDRATE 500 MG/ML
INJECTION, SOLUTION INTRAMUSCULAR; INTRAVENOUS AS NEEDED
Status: DISCONTINUED | OUTPATIENT
Start: 2021-12-08 | End: 2021-12-08 | Stop reason: SURG

## 2021-12-08 RX ORDER — KETAMINE HYDROCHLORIDE 10 MG/ML
INJECTION INTRAMUSCULAR; INTRAVENOUS AS NEEDED
Status: DISCONTINUED | OUTPATIENT
Start: 2021-12-08 | End: 2021-12-08 | Stop reason: SURG

## 2021-12-08 RX ORDER — MIDAZOLAM HYDROCHLORIDE 1 MG/ML
1 INJECTION INTRAMUSCULAR; INTRAVENOUS
Status: COMPLETED | OUTPATIENT
Start: 2021-12-08 | End: 2021-12-08

## 2021-12-08 RX ORDER — SODIUM CHLORIDE 0.9 % (FLUSH) 0.9 %
3 SYRINGE (ML) INJECTION EVERY 12 HOURS SCHEDULED
Status: DISCONTINUED | OUTPATIENT
Start: 2021-12-08 | End: 2021-12-08 | Stop reason: HOSPADM

## 2021-12-08 RX ORDER — ONDANSETRON 4 MG/1
4 TABLET, FILM COATED ORAL EVERY 4 HOURS PRN
Status: DISCONTINUED | OUTPATIENT
Start: 2021-12-08 | End: 2021-12-09 | Stop reason: HOSPADM

## 2021-12-08 RX ORDER — SODIUM CHLORIDE 9 MG/ML
INJECTION, SOLUTION INTRAVENOUS AS NEEDED
Status: DISCONTINUED | OUTPATIENT
Start: 2021-12-08 | End: 2021-12-08 | Stop reason: HOSPADM

## 2021-12-08 RX ORDER — PROMETHAZINE HYDROCHLORIDE 12.5 MG/1
12.5 TABLET ORAL EVERY 4 HOURS PRN
Status: DISCONTINUED | OUTPATIENT
Start: 2021-12-08 | End: 2021-12-09 | Stop reason: HOSPADM

## 2021-12-08 RX ORDER — OXYCODONE AND ACETAMINOPHEN 7.5; 325 MG/1; MG/1
1 TABLET ORAL EVERY 4 HOURS PRN
Status: DISCONTINUED | OUTPATIENT
Start: 2021-12-08 | End: 2021-12-08 | Stop reason: HOSPADM

## 2021-12-08 RX ORDER — EPHEDRINE SULFATE 50 MG/ML
5 INJECTION, SOLUTION INTRAVENOUS ONCE AS NEEDED
Status: DISCONTINUED | OUTPATIENT
Start: 2021-12-08 | End: 2021-12-08 | Stop reason: HOSPADM

## 2021-12-08 RX ORDER — PROMETHAZINE HYDROCHLORIDE 25 MG/1
25 SUPPOSITORY RECTAL ONCE AS NEEDED
Status: DISCONTINUED | OUTPATIENT
Start: 2021-12-08 | End: 2021-12-08 | Stop reason: HOSPADM

## 2021-12-08 RX ORDER — ONDANSETRON 2 MG/ML
4 INJECTION INTRAMUSCULAR; INTRAVENOUS ONCE AS NEEDED
Status: COMPLETED | OUTPATIENT
Start: 2021-12-08 | End: 2021-12-08

## 2021-12-08 RX ORDER — MAGNESIUM HYDROXIDE 1200 MG/15ML
LIQUID ORAL AS NEEDED
Status: DISCONTINUED | OUTPATIENT
Start: 2021-12-08 | End: 2021-12-08 | Stop reason: HOSPADM

## 2021-12-08 RX ORDER — METOCLOPRAMIDE HYDROCHLORIDE 5 MG/ML
10 INJECTION INTRAMUSCULAR; INTRAVENOUS ONCE
Status: COMPLETED | OUTPATIENT
Start: 2021-12-08 | End: 2021-12-08

## 2021-12-08 RX ORDER — ONDANSETRON 2 MG/ML
INJECTION INTRAMUSCULAR; INTRAVENOUS AS NEEDED
Status: DISCONTINUED | OUTPATIENT
Start: 2021-12-08 | End: 2021-12-08 | Stop reason: SURG

## 2021-12-08 RX ORDER — FLUMAZENIL 0.1 MG/ML
0.2 INJECTION INTRAVENOUS AS NEEDED
Status: DISCONTINUED | OUTPATIENT
Start: 2021-12-08 | End: 2021-12-08 | Stop reason: HOSPADM

## 2021-12-08 RX ORDER — METOCLOPRAMIDE HYDROCHLORIDE 5 MG/ML
10 INJECTION INTRAMUSCULAR; INTRAVENOUS EVERY 6 HOURS
Status: DISCONTINUED | OUTPATIENT
Start: 2021-12-08 | End: 2021-12-09 | Stop reason: HOSPADM

## 2021-12-08 RX ORDER — HYDRALAZINE HYDROCHLORIDE 20 MG/ML
10 INJECTION INTRAMUSCULAR; INTRAVENOUS
Status: DISCONTINUED | OUTPATIENT
Start: 2021-12-08 | End: 2021-12-09 | Stop reason: HOSPADM

## 2021-12-08 RX ORDER — FENTANYL CITRATE 50 UG/ML
50 INJECTION, SOLUTION INTRAMUSCULAR; INTRAVENOUS
Status: DISCONTINUED | OUTPATIENT
Start: 2021-12-08 | End: 2021-12-08 | Stop reason: HOSPADM

## 2021-12-08 RX ORDER — FAMOTIDINE 10 MG/ML
20 INJECTION, SOLUTION INTRAVENOUS EVERY 12 HOURS SCHEDULED
Status: DISCONTINUED | OUTPATIENT
Start: 2021-12-08 | End: 2021-12-09 | Stop reason: HOSPADM

## 2021-12-08 RX ORDER — HYDROMORPHONE HYDROCHLORIDE 1 MG/ML
0.5 INJECTION, SOLUTION INTRAMUSCULAR; INTRAVENOUS; SUBCUTANEOUS
Status: DISCONTINUED | OUTPATIENT
Start: 2021-12-08 | End: 2021-12-08 | Stop reason: HOSPADM

## 2021-12-08 RX ORDER — DEXAMETHASONE SODIUM PHOSPHATE 10 MG/ML
INJECTION INTRAMUSCULAR; INTRAVENOUS AS NEEDED
Status: DISCONTINUED | OUTPATIENT
Start: 2021-12-08 | End: 2021-12-08 | Stop reason: SURG

## 2021-12-08 RX ORDER — HYDROMORPHONE HYDROCHLORIDE 1 MG/ML
0.5 INJECTION, SOLUTION INTRAMUSCULAR; INTRAVENOUS; SUBCUTANEOUS
Status: DISCONTINUED | OUTPATIENT
Start: 2021-12-08 | End: 2021-12-09 | Stop reason: HOSPADM

## 2021-12-08 RX ORDER — INSULIN LISPRO 100 [IU]/ML
0-14 INJECTION, SOLUTION INTRAVENOUS; SUBCUTANEOUS
Status: DISCONTINUED | OUTPATIENT
Start: 2021-12-08 | End: 2021-12-09 | Stop reason: HOSPADM

## 2021-12-08 RX ORDER — IBUPROFEN 600 MG/1
600 TABLET ORAL ONCE AS NEEDED
Status: DISCONTINUED | OUTPATIENT
Start: 2021-12-08 | End: 2021-12-08 | Stop reason: HOSPADM

## 2021-12-08 RX ORDER — ACETAMINOPHEN 500 MG
1000 TABLET ORAL EVERY 6 HOURS
Status: DISCONTINUED | OUTPATIENT
Start: 2021-12-08 | End: 2021-12-09 | Stop reason: HOSPADM

## 2021-12-08 RX ORDER — PROPOFOL 10 MG/ML
VIAL (ML) INTRAVENOUS AS NEEDED
Status: DISCONTINUED | OUTPATIENT
Start: 2021-12-08 | End: 2021-12-08 | Stop reason: SURG

## 2021-12-08 RX ADMIN — INSULIN LISPRO 3 UNITS: 100 INJECTION, SOLUTION INTRAVENOUS; SUBCUTANEOUS at 21:25

## 2021-12-08 RX ADMIN — GABAPENTIN 300 MG: 300 CAPSULE ORAL at 21:25

## 2021-12-08 RX ADMIN — SCOPALAMINE 1 PATCH: 1 PATCH, EXTENDED RELEASE TRANSDERMAL at 06:50

## 2021-12-08 RX ADMIN — SODIUM CHLORIDE, POTASSIUM CHLORIDE, SODIUM LACTATE AND CALCIUM CHLORIDE 150 ML/HR: 600; 310; 30; 20 INJECTION, SOLUTION INTRAVENOUS at 15:29

## 2021-12-08 RX ADMIN — PROPOFOL 130 MCG/KG/MIN: 10 INJECTION, EMULSION INTRAVENOUS at 08:24

## 2021-12-08 RX ADMIN — KETAMINE HYDROCHLORIDE 20 MG: 10 INJECTION INTRAMUSCULAR; INTRAVENOUS at 08:29

## 2021-12-08 RX ADMIN — PANTOPRAZOLE SODIUM 40 MG: 40 INJECTION, POWDER, FOR SOLUTION INTRAVENOUS at 07:04

## 2021-12-08 RX ADMIN — PROMETHAZINE HYDROCHLORIDE 25 MG: 25 SUPPOSITORY RECTAL at 06:52

## 2021-12-08 RX ADMIN — METOCLOPRAMIDE HYDROCHLORIDE 10 MG: 5 INJECTION INTRAMUSCULAR; INTRAVENOUS at 21:25

## 2021-12-08 RX ADMIN — ENOXAPARIN SODIUM 40 MG: 40 INJECTION SUBCUTANEOUS at 10:31

## 2021-12-08 RX ADMIN — METOCLOPRAMIDE HYDROCHLORIDE 10 MG: 5 INJECTION INTRAMUSCULAR; INTRAVENOUS at 14:18

## 2021-12-08 RX ADMIN — SODIUM CHLORIDE, POTASSIUM CHLORIDE, SODIUM LACTATE AND CALCIUM CHLORIDE 500 ML: 600; 310; 30; 20 INJECTION, SOLUTION INTRAVENOUS at 07:04

## 2021-12-08 RX ADMIN — MAGNESIUM SULFATE HEPTAHYDRATE 2 G: 500 INJECTION, SOLUTION INTRAMUSCULAR; INTRAVENOUS at 08:29

## 2021-12-08 RX ADMIN — CHLORHEXIDINE GLUCONATE 15 ML: 1.2 RINSE ORAL at 07:04

## 2021-12-08 RX ADMIN — ACETAMINOPHEN 1000 MG: 500 TABLET ORAL at 15:28

## 2021-12-08 RX ADMIN — ONDANSETRON 4 MG: 2 INJECTION INTRAMUSCULAR; INTRAVENOUS at 08:32

## 2021-12-08 RX ADMIN — FENTANYL CITRATE 50 MCG: 50 INJECTION INTRAMUSCULAR; INTRAVENOUS at 10:22

## 2021-12-08 RX ADMIN — DEXAMETHASONE SODIUM PHOSPHATE 16 MG: 10 INJECTION INTRAMUSCULAR; INTRAVENOUS at 08:30

## 2021-12-08 RX ADMIN — SODIUM CHLORIDE, POTASSIUM CHLORIDE, SODIUM LACTATE AND CALCIUM CHLORIDE 9 ML/HR: 600; 310; 30; 20 INJECTION, SOLUTION INTRAVENOUS at 09:42

## 2021-12-08 RX ADMIN — ACETAMINOPHEN 1000 MG: 500 TABLET ORAL at 21:25

## 2021-12-08 RX ADMIN — FENTANYL CITRATE 50 MCG: 50 INJECTION INTRAMUSCULAR; INTRAVENOUS at 09:52

## 2021-12-08 RX ADMIN — SODIUM CHLORIDE, POTASSIUM CHLORIDE, SODIUM LACTATE AND CALCIUM CHLORIDE 9 ML/HR: 600; 310; 30; 20 INJECTION, SOLUTION INTRAVENOUS at 08:00

## 2021-12-08 RX ADMIN — LIDOCAINE HYDROCHLORIDE 100 MG: 20 INJECTION, SOLUTION INFILTRATION; PERINEURAL at 08:24

## 2021-12-08 RX ADMIN — SUGAMMADEX 400 MG: 100 INJECTION, SOLUTION INTRAVENOUS at 09:10

## 2021-12-08 RX ADMIN — FENTANYL CITRATE 50 MCG: 50 INJECTION INTRAMUSCULAR; INTRAVENOUS at 08:23

## 2021-12-08 RX ADMIN — METOCLOPRAMIDE HYDROCHLORIDE 10 MG: 5 INJECTION INTRAMUSCULAR; INTRAVENOUS at 07:04

## 2021-12-08 RX ADMIN — AMISULPRIDE 10 MG: 2.5 INJECTION, SOLUTION INTRAVENOUS at 10:27

## 2021-12-08 RX ADMIN — FOLIC ACID 250 ML/HR: 5 INJECTION, SOLUTION INTRAMUSCULAR; INTRAVENOUS; SUBCUTANEOUS at 23:44

## 2021-12-08 RX ADMIN — PROPOFOL 200 MG: 10 INJECTION, EMULSION INTRAVENOUS at 08:24

## 2021-12-08 RX ADMIN — GABAPENTIN 300 MG: 250 SOLUTION ORAL at 14:18

## 2021-12-08 RX ADMIN — ACETAMINOPHEN ORAL SOLUTION 975 MG: 325 SOLUTION ORAL at 06:50

## 2021-12-08 RX ADMIN — LIDOCAINE HYDROCHLORIDE 0.5 MG/MIN: 4 INJECTION, SOLUTION INTRAVENOUS at 08:29

## 2021-12-08 RX ADMIN — FENTANYL CITRATE 50 MCG: 50 INJECTION INTRAMUSCULAR; INTRAVENOUS at 08:41

## 2021-12-08 RX ADMIN — MIRTAZAPINE 15 MG: 15 TABLET, ORALLY DISINTEGRATING ORAL at 21:24

## 2021-12-08 RX ADMIN — CARVEDILOL 25 MG: 25 TABLET, FILM COATED ORAL at 21:25

## 2021-12-08 RX ADMIN — Medication 3 G: at 08:18

## 2021-12-08 RX ADMIN — SODIUM CHLORIDE, PRESERVATIVE FREE 3 ML: 5 INJECTION INTRAVENOUS at 21:26

## 2021-12-08 RX ADMIN — FAMOTIDINE 20 MG: 10 INJECTION INTRAVENOUS at 21:25

## 2021-12-08 RX ADMIN — MIDAZOLAM 1 MG: 1 INJECTION INTRAMUSCULAR; INTRAVENOUS at 07:15

## 2021-12-08 RX ADMIN — ONDANSETRON 4 MG: 2 INJECTION INTRAMUSCULAR; INTRAVENOUS at 09:55

## 2021-12-08 RX ADMIN — ROCURONIUM BROMIDE 40 MG: 50 INJECTION INTRAVENOUS at 08:24

## 2021-12-08 RX ADMIN — MIDAZOLAM 1 MG: 1 INJECTION INTRAMUSCULAR; INTRAVENOUS at 07:20

## 2021-12-08 RX ADMIN — GABAPENTIN 300 MG: 250 SOLUTION ORAL at 06:50

## 2021-12-08 NOTE — ANESTHESIA PREPROCEDURE EVALUATION
Anesthesia Evaluation     Patient summary reviewed and Nursing notes reviewed   no history of anesthetic complications:  NPO Solid Status: > 8 hours  NPO Liquid Status: > 2 hours           Airway   Mallampati: II  TM distance: >3 FB  Neck ROM: full  Large neck circumference  Comment: Bearded    Possible challenging mask  Dental      Pulmonary    (+) sleep apnea,   Cardiovascular     ECG reviewed    (+) hypertension, hyperlipidemia,       Neuro/Psych  GI/Hepatic/Renal/Endo    (+) morbid obesity,  renal disease CRI, diabetes mellitus,     Musculoskeletal     Abdominal    Substance History      OB/GYN          Other                      Anesthesia Plan    ASA 3     general     intravenous induction     Anesthetic plan, all risks, benefits, and alternatives have been provided, discussed and informed consent has been obtained with: patient.

## 2021-12-08 NOTE — ANESTHESIA POSTPROCEDURE EVALUATION
Patient: Rula Bardales    Procedure Summary     Date: 12/08/21 Room / Location:  [facility]    Anesthesia Start: 0813 Anesthesia Stop: 0934    Procedure: GASTRIC SLEEVE LAPAROSCOPIC (N/A Abdomen) Diagnosis:       Class 3 severe obesity due to excess calories with serious comorbidity and body mass index (BMI) of 50.0 to 59.9 in adult (HCC)      (Class 3 severe obesity due to excess calories with serious comorbidity and body mass index (BMI) of 50.0 to 59.9 in adult (HCC) [E66.01, Z68.43])    Surgeons: Farzad Polo Jr., MD Provider: Socorro Deluca MD    Anesthesia Type: general ASA Status: 3          Anesthesia Type: general    Vitals  Vitals Value Taken Time   /98 12/08/21 1105   Temp 36.4 °C (97.5 °F) 12/08/21 1105   Pulse 75 12/08/21 1112   Resp 16 12/08/21 1105   SpO2 98 % 12/08/21 1113   Vitals shown include unvalidated device data.        Post Anesthesia Care and Evaluation    Patient location during evaluation: bedside  Patient participation: complete - patient participated  Level of consciousness: awake and alert  Pain management: adequate  Airway patency: patent  Anesthetic complications: No anesthetic complications  PONV Status: controlled  Cardiovascular status: acceptable  Respiratory status: acceptable  Hydration status: acceptable

## 2021-12-08 NOTE — PLAN OF CARE
Goal Outcome Evaluation:           Progress: improving  Outcome Summary: AVSS. up ad may walking the hallways. gas pain improving. lap sites CDI. will ctm

## 2021-12-08 NOTE — OP NOTE
PREOPERATIVE DIAGNOSIS:  Morbid obesity with multiple comorbidities as referenced in the most recent history and physical.    POSTOPERATIVE DIAGNOSIS:  Morbid obesity with multiple comorbidities as referenced in the most recent history and physical.    PROCEDURES PERFORMED:  1.  Laparoscopic sleeve gastrectomy with Titan stapler #[id number]  2.  Tisseel application.     SURGEON:  Farzad Polo Jr., MD    ASSISTANT: Celi GILLILAND [facility]          Surgery assisted and facilitated by a certified physician assistant, who directly resulted in a decreased operative time, anesthetic time, wound exposure, and possibly of an operative wound infection, thereby decreasing patient morbidity and ultimately total expenditures.  The surgical assistant assisted in placement of trochars, take down of the gastrocolic omentum, short gastric vessels and dissection at the angle of His.  Also assisted in retraction of the stomach during stapling so as not to kink the gastric sleeve.  Also assisted in removing of the gastric specimen, closure of the fascial defect as well as closure of the skin incisions.    ANESTHESIA:  General endotracheal and TAP block    ESTIMATED BLOOD LOSS:   Less than 25 mL unless dictated below.    FLUIDS:  Crystalloids.    SPECIMENS:  Gastric remnant    DRAINS:  None.    COUNTS:  Correct.    COMPLICATIONS:  None.    INDICATIONS:  This patient with morbid obesity and associated comorbidities presents for elective laparoscopic, possible open sleeve gastrectomy.  The patient has received medical clearance to proceed.  The patient has undergone our extensive educational process and consent process and wishes to proceed.    DESCRIPTION OF PROCEDURE:  The patient was brought to the operating room and placed supine upon the operating room table. SCD hose were placed.  The patient underwent uneventful general endotracheal anesthesia per the anesthesiology staff. The abdomen was prepped with ChloraPrep and draped in the  usual sterile fashion.  An Ioban was used as well if not allergic.  Anesthesia staff then passed a 38-Fr balloon bougie into the stomach to decompress.  A 5-10 mm transverse incision was made a few centimeters above and to the left of the umbilicus and the peritoneal cavity entered under direct camera visualization using a 5 or 10 mm 0° laparoscope and an Optiview trocar.  The abdomen was then insufflated to a pressure of 15-16 mmHg with CO2 gas.  Exploratory laparoscopy revealed no evidence of injury from the entrance technique and no significant abnormalities unless addendum dictated below.  An angled laparoscope was then used.  The patient was placed in reverse Trendelenburg position.  Under direct camera visualization a 19 mm trocar was placed in the right lateral subcostal position.  A 5 mm trocar was placed in the right midabdominal position.  A 5 mm trocar was placed in the left midabdominal position. A Elyssa retractor was placed through an epigastric incision and used to elevate the left lobe of the liver.  The fat pad was elevated and the left sanchez exposed.  At this point, approximately CHCF along the greater curvature, the gastrocolic omentum was divided with the Enseal and this proceeded superiorly to the angle of His taking down the short gastric vessels.  All posterior attachments of the lesser sac and posterior aspect of the stomach to the pancreas were taken down as well.  The left sanchez was exposed along its length.  Dissection then proceeded medially taking down the greater curvature with an Enseal until just proximal to the pylorus.  The 38 Setswana bougie was then directed distally into the stomach to the pylorus.  The balloon was inflated with 14 cc of saline.  The stomach was marked in the 3 positions using indelible ink.  The 3 markings were at the angle of Hiss, the incisura and antrum using 1cm, 3cm and 4-5cm respectively.  The Titan stapler was then placed through the 19 mm trocar into  the abdomen and opened up and the stomach pulled in to the markings on the stomach.  Careful attention was made to stay 1 cm from the esophagus.  Positive pressure retraction was then used to size the stomach perfectly.  The balloon on the bougie was then deflated and placed to suction prior to closing the stapler.  The stapler was then fired and then removed after completion.  Areas of the staple line were oversewn with absorbable sutures as needed for bleeding or questionable staple lines.  At this point, the gastrectomy specimen was withdrawn through the 12 mm trocar site incision. The specimen staple line was inspected and was intact.  The specimen was then sent off to pathology.  At this point, the sleeve was submerged under saline and using the bougie to insufflate the stomach, a leak test was performed.  This revealed the sleeve to be watertight, no air bubbles, no leak, and no bleeding seen from the staple lines and no significant abnormalities.  Irrigation fluid from the abdomen was then suctioned.  The gastric sleeve staple line was then treated with Tisseel fibrin glue. The fascia at the 19 mm trocar site incision was closed with a single 0 Vicryl suture in a figure-of-eight fashion placed under direct laparoscopic camera visualization with a suture passer and tying the knot extracorporeally.  The fascia in the area was infiltrated with local anesthesia. All incisions were then infiltrated with local anesthetic. The remaining trocars were removed under direct camera visualization with no bleeding noted from their sites.  The abdomen was desufflated of gas. The skin in each incision was closed using 4-0 antibiotic impregnated Monocryl in a subcuticular fashion followed by Dermabond.  The patient tolerated the procedure well without complication and was taken to the recovery room in stable condition.  All sponge, needle and instrument counts were correct.     The hiatus was checked for a hernia and no  hernia was detected

## 2021-12-08 NOTE — ANESTHESIA PROCEDURE NOTES
Airway  Urgency: elective    Date/Time: 12/8/2021 8:27 AM  Airway not difficult    General Information and Staff    Patient location during procedure: OR  Anesthesiologist: Socorro Deluca MD  CRNA: Mariaelena Robles CRNA    Indications and Patient Condition  Indications for airway management: airway protection    Preoxygenated: yes  MILS not maintained throughout  Mask difficulty assessment: 2 - vent by mask + OA or adjuvant +/- NMBA    Final Airway Details  Final airway type: endotracheal airway      Successful airway: ETT  Cuffed: yes   Successful intubation technique: direct laryngoscopy  Facilitating devices/methods: intubating stylet  Endotracheal tube insertion site: oral  Blade: Abraham  Blade size: 4  ETT size (mm): 7.5  Cormack-Lehane Classification: grade I - full view of glottis  Placement verified by: chest auscultation and capnometry   Measured from: lips  ETT/EBT  to lips (cm): 23  Number of attempts at approach: 1  Assessment: lips, teeth, and gum same as pre-op and atraumatic intubation

## 2021-12-09 VITALS
WEIGHT: 315 LBS | SYSTOLIC BLOOD PRESSURE: 147 MMHG | RESPIRATION RATE: 16 BRPM | DIASTOLIC BLOOD PRESSURE: 83 MMHG | OXYGEN SATURATION: 98 % | HEART RATE: 86 BPM | HEIGHT: 70 IN | BODY MASS INDEX: 45.1 KG/M2 | TEMPERATURE: 98.2 F

## 2021-12-09 LAB
ALBUMIN SERPL-MCNC: 3.7 G/DL (ref 3.5–5.2)
ALBUMIN/GLOB SERPL: 1 G/DL
ALP SERPL-CCNC: 82 U/L (ref 39–117)
ALT SERPL W P-5'-P-CCNC: 36 U/L (ref 1–41)
ANION GAP SERPL CALCULATED.3IONS-SCNC: 14.2 MMOL/L (ref 5–15)
AST SERPL-CCNC: 24 U/L (ref 1–40)
BASOPHILS # BLD AUTO: 0.01 10*3/MM3 (ref 0–0.2)
BASOPHILS NFR BLD AUTO: 0.1 % (ref 0–1.5)
BILIRUB SERPL-MCNC: 0.3 MG/DL (ref 0–1.2)
BUN SERPL-MCNC: 21 MG/DL (ref 6–20)
BUN/CREAT SERPL: 15.7 (ref 7–25)
CALCIUM SPEC-SCNC: 9.2 MG/DL (ref 8.6–10.5)
CHLORIDE SERPL-SCNC: 99 MMOL/L (ref 98–107)
CO2 SERPL-SCNC: 24.8 MMOL/L (ref 22–29)
CREAT SERPL-MCNC: 1.34 MG/DL (ref 0.76–1.27)
DEPRECATED RDW RBC AUTO: 40.1 FL (ref 37–54)
EOSINOPHIL # BLD AUTO: 0 10*3/MM3 (ref 0–0.4)
EOSINOPHIL NFR BLD AUTO: 0 % (ref 0.3–6.2)
ERYTHROCYTE [DISTWIDTH] IN BLOOD BY AUTOMATED COUNT: 13.1 % (ref 12.3–15.4)
GFR SERPL CREATININE-BSD FRML MDRD: 71 ML/MIN/1.73
GLOBULIN UR ELPH-MCNC: 3.7 GM/DL
GLUCOSE BLDC GLUCOMTR-MCNC: 146 MG/DL (ref 70–130)
GLUCOSE SERPL-MCNC: 158 MG/DL (ref 65–99)
HCT VFR BLD AUTO: 44.9 % (ref 37.5–51)
HGB BLD-MCNC: 15.7 G/DL (ref 13–17.7)
IMM GRANULOCYTES # BLD AUTO: 0.05 10*3/MM3 (ref 0–0.05)
IMM GRANULOCYTES NFR BLD AUTO: 0.4 % (ref 0–0.5)
LAB AP CASE REPORT: NORMAL
LYMPHOCYTES # BLD AUTO: 1.95 10*3/MM3 (ref 0.7–3.1)
LYMPHOCYTES NFR BLD AUTO: 15.6 % (ref 19.6–45.3)
MAGNESIUM SERPL-MCNC: 2.1 MG/DL (ref 1.6–2.6)
MCH RBC QN AUTO: 29.3 PG (ref 26.6–33)
MCHC RBC AUTO-ENTMCNC: 35 G/DL (ref 31.5–35.7)
MCV RBC AUTO: 83.8 FL (ref 79–97)
MONOCYTES # BLD AUTO: 0.8 10*3/MM3 (ref 0.1–0.9)
MONOCYTES NFR BLD AUTO: 6.4 % (ref 5–12)
NEUTROPHILS NFR BLD AUTO: 77.5 % (ref 42.7–76)
NEUTROPHILS NFR BLD AUTO: 9.66 10*3/MM3 (ref 1.7–7)
NRBC BLD AUTO-RTO: 0 /100 WBC (ref 0–0.2)
PATH REPORT.FINAL DX SPEC: NORMAL
PATH REPORT.GROSS SPEC: NORMAL
PHOSPHATE SERPL-MCNC: 3.7 MG/DL (ref 2.5–4.5)
PLATELET # BLD AUTO: 285 10*3/MM3 (ref 140–450)
PMV BLD AUTO: 10.3 FL (ref 6–12)
POTASSIUM SERPL-SCNC: 3.1 MMOL/L (ref 3.5–5.2)
PROT SERPL-MCNC: 7.4 G/DL (ref 6–8.5)
RBC # BLD AUTO: 5.36 10*6/MM3 (ref 4.14–5.8)
SODIUM SERPL-SCNC: 138 MMOL/L (ref 136–145)
WBC NRBC COR # BLD: 12.47 10*3/MM3 (ref 3.4–10.8)

## 2021-12-09 PROCEDURE — 82962 GLUCOSE BLOOD TEST: CPT

## 2021-12-09 PROCEDURE — 25010000002 METOCLOPRAMIDE PER 10 MG: Performed by: SURGERY

## 2021-12-09 PROCEDURE — 25010000002 ENOXAPARIN PER 10 MG: Performed by: SURGERY

## 2021-12-09 PROCEDURE — 25010000002 HYDRALAZINE PER 20 MG: Performed by: SURGERY

## 2021-12-09 PROCEDURE — 25010000002 THIAMINE PER 100 MG: Performed by: SURGERY

## 2021-12-09 PROCEDURE — 84100 ASSAY OF PHOSPHORUS: CPT | Performed by: SURGERY

## 2021-12-09 PROCEDURE — 83735 ASSAY OF MAGNESIUM: CPT | Performed by: SURGERY

## 2021-12-09 PROCEDURE — 80053 COMPREHEN METABOLIC PANEL: CPT | Performed by: SURGERY

## 2021-12-09 PROCEDURE — 25010000002 CYANOCOBALAMIN PER 1000 MCG: Performed by: SURGERY

## 2021-12-09 PROCEDURE — 85025 COMPLETE CBC W/AUTO DIFF WBC: CPT | Performed by: SURGERY

## 2021-12-09 RX ORDER — HYDROMORPHONE HYDROCHLORIDE 2 MG/1
2 TABLET ORAL EVERY 4 HOURS PRN
Qty: 10 TABLET | Refills: 0 | Status: SHIPPED | OUTPATIENT
Start: 2021-12-09 | End: 2021-12-12

## 2021-12-09 RX ORDER — ONDANSETRON 4 MG/1
4 TABLET, ORALLY DISINTEGRATING ORAL EVERY 4 HOURS PRN
Qty: 14 TABLET | Refills: 0 | Status: SHIPPED | OUTPATIENT
Start: 2021-12-09

## 2021-12-09 RX ORDER — POTASSIUM CHLORIDE 750 MG/1
40 TABLET, FILM COATED, EXTENDED RELEASE ORAL ONCE
Status: COMPLETED | OUTPATIENT
Start: 2021-12-09 | End: 2021-12-09

## 2021-12-09 RX ADMIN — METOCLOPRAMIDE HYDROCHLORIDE 10 MG: 5 INJECTION INTRAMUSCULAR; INTRAVENOUS at 03:15

## 2021-12-09 RX ADMIN — CYANOCOBALAMIN 1000 MCG: 1000 INJECTION, SOLUTION INTRAMUSCULAR at 08:42

## 2021-12-09 RX ADMIN — ENOXAPARIN SODIUM 40 MG: 40 INJECTION SUBCUTANEOUS at 08:41

## 2021-12-09 RX ADMIN — CARVEDILOL 25 MG: 25 TABLET, FILM COATED ORAL at 08:41

## 2021-12-09 RX ADMIN — HYDRALAZINE HYDROCHLORIDE 10 MG: 20 INJECTION INTRAMUSCULAR; INTRAVENOUS at 00:16

## 2021-12-09 RX ADMIN — POTASSIUM CHLORIDE 40 MEQ: 750 TABLET, EXTENDED RELEASE ORAL at 09:48

## 2021-12-09 RX ADMIN — METOCLOPRAMIDE HYDROCHLORIDE 10 MG: 5 INJECTION INTRAMUSCULAR; INTRAVENOUS at 08:41

## 2021-12-09 RX ADMIN — ACETAMINOPHEN 1000 MG: 500 TABLET ORAL at 03:16

## 2021-12-09 RX ADMIN — SODIUM CHLORIDE, PRESERVATIVE FREE 3 ML: 5 INJECTION INTRAVENOUS at 08:42

## 2021-12-09 RX ADMIN — HYOSCYAMINE SULFATE 125 MCG: 0.12 TABLET, ORALLY DISINTEGRATING ORAL at 09:48

## 2021-12-09 RX ADMIN — GABAPENTIN 300 MG: 300 CAPSULE ORAL at 06:01

## 2021-12-09 NOTE — PAYOR COMM NOTE
"DISCHARGED  REF #[id number]      Rula Bardales (41 y.o. Male)             Date of Birth Social Security Number Address Home Phone MRN    1980  [id number] [id number] [phone] [id number]    Confucianist Marital Status             Faith        Admission Date Admission Type Admitting Provider Attending Provider Department, Room/Bed    12/8/21 Elective Alanna Polo Jr., MD  [address], P889/1    Discharge Date Discharge Disposition Discharge Destination          12/9/2021 Home or Self Care              Attending Provider: (none)   Allergies: Ace Inhibitors    Isolation: None   Infection: None   Code Status: CPR   Advance Care Planning Activity    Ht: 177.8 cm (70\")   Wt: 168 kg (370 lb)    Admission Cmt: None   Principal Problem: Class 3 severe obesity due to excess calories with serious comorbidity and body mass index (BMI) of 50.0 to 59.9 in adult ([facility]) [E66.01,Z68.43]                 Active Insurance as of 12/8/2021     Primary Coverage     Payor Plan Insurance Group Employer/Plan Group    [facility] BY AGOSTO [id number]     Payor Plan Address Payor Plan Phone Number Payor Plan Fax Number Effective Dates    [address]   1/1/2021 - None Entered    [place] [address]       Subscriber Name Subscriber Birth Date Member ID       RULA BARDALES 1980 [id number]                 Emergency Contacts      (Rel.) Home Phone Work Phone Mobile Phone    Melvina BARDALES (Spouse) -- -- [phone]            Discharge Order (From admission, onward)     Start     Ordered    12/09/21 0948  Discharge patient  Once        Expected Discharge Date: 12/09/21    Expected Discharge Time: Morning    Discharge Disposition: Home or Self Care    Physician of Record for Attribution - Please select from Treatment Team: ALANNA POLO JR [[id number]]    Review needed by CMO to determine Physician of Record: No       Question " Answer Comment   Physician of Record for Attribution - Please select from Treatment Team ALANNA KC JR    Review needed by CMO to determine Physician of Record No        12/09/21 0919

## 2021-12-09 NOTE — PLAN OF CARE
Goal Outcome Evaluation:  Plan of Care Reviewed With: patient, spouse        Progress: improving  Outcome Summary: 42yo male POD 1 Gastric sleeve. VSS. NVI. Pt on room air. Tolerating ambulation well. Gas pain improving. Educated pt on safety and HTN management. plans for d/c home today. will continue to monitor.

## 2021-12-09 NOTE — CASE MANAGEMENT/SOCIAL WORK
Case Management Discharge Note      Final Note: Home.         Selected Continued Care - Discharged on 12/9/2021 Admission date: 12/8/2021 - Discharge disposition: Home or Self Care    Destination    No services have been selected for the patient.              Durable Medical Equipment    No services have been selected for the patient.              Dialysis/Infusion    No services have been selected for the patient.              Home Medical Care    No services have been selected for the patient.              Therapy    No services have been selected for the patient.              Community Resources    No services have been selected for the patient.              Community & DME    No services have been selected for the patient.                       Final Discharge Disposition Code: 01 - home or self-care

## 2021-12-09 NOTE — NURSING NOTE
Pt tolerated one time dose of PO potassium supplements well and without any side effects.     Nurse explained all d/c instructions to pt and spouse - both verbalized understanding. Patient demonstrated appropriate method of Lovenox administration to nurse. Patient to  medications from pharmacy on the way out. Pt taken out in w/c.

## 2021-12-09 NOTE — DISCHARGE SUMMARY
"Discharge Summary    Patient name: Rula Bardales    Medical record number: [id number]    Admission date: 12/8/2021  Discharge date:  12/9/2021    Attending physician: Dr. Farzad Polo    Primary care physician: Provider, No Known    Referring physician: Farzad Polo Jr., MD  [address] [address]    Condition on discharge: Stable    Primary Diagnoses:  Morbid obesity with co-morbidities    Operative Procedure:  Laparoscopic sleeve gastrectomy (titan)     Rula Bardales  is post op day one status post procedure listed. Patient denies shortness of air and lower extremity pain. Feels better than yesterday. No vomiting this am. Ambulating well and using incentive spirometer.          /83 (BP Location: Right arm, Patient Position: Lying)   Pulse 86   Temp 98.2 °F (36.8 °C) (Oral)   Resp 16   Ht 177.8 cm (70\")   Wt (!) 168 kg (370 lb)   SpO2 98%   BMI 53.09 kg/m²     General:  alert, appears stated age and cooperative   Abdomen: soft, bowel sounds active, appropriate tenderness   Incision:   healing well, no drainage, no erythema, no hernia, no seroma, no swelling, no dehiscence, incision well approximated   Heart: Regular rate   Lungs: Clear to auscultation bilaterally     I reviewed the patient's new clinical results.     Lab Results (last 24 hours)     Procedure Component Value Units Date/Time    Comprehensive Metabolic Panel [[id number]]  (Abnormal) Collected: 12/09/21 0646    Specimen: Blood Updated: 12/09/21 0831     Glucose 158 mg/dL      BUN 21 mg/dL      Creatinine 1.34 mg/dL      Sodium 138 mmol/L      Potassium 3.1 mmol/L      Chloride 99 mmol/L      CO2 24.8 mmol/L      Calcium 9.2 mg/dL      Total Protein 7.4 g/dL      Albumin 3.70 g/dL      ALT (SGPT) 36 U/L      AST (SGOT) 24 U/L      Alkaline Phosphatase 82 U/L      Total Bilirubin 0.3 mg/dL      eGFR  African Amer 71 mL/min/1.73      Globulin 3.7 gm/dL      A/G Ratio 1.0 g/dL      BUN/Creatinine Ratio 15.7    "  Anion Gap 14.2 mmol/L     Narrative:      GFR Normal >60  Chronic Kidney Disease <60  Kidney Failure <15      Phosphorus [[id number]]  (Normal) Collected: 12/09/21 0646    Specimen: Blood Updated: 12/09/21 0831     Phosphorus 3.7 mg/dL     Magnesium [[id number]]  (Normal) Collected: 12/09/21 0646    Specimen: Blood Updated: 12/09/21 0831     Magnesium 2.1 mg/dL     CBC & Differential [[id number]]  (Abnormal) Collected: 12/09/21 0646    Specimen: Blood Updated: 12/09/21 0736    Narrative:      The following orders were created for panel order CBC & Differential.  Procedure                               Abnormality         Status                     ---------                               -----------         ------                     CBC Auto Differential[[id number]]        Abnormal            Final result                 Please view results for these tests on the individual orders.    CBC Auto Differential [[id number]]  (Abnormal) Collected: 12/09/21 0646    Specimen: Blood Updated: 12/09/21 0736     WBC 12.47 10*3/mm3      RBC 5.36 10*6/mm3      Hemoglobin 15.7 g/dL      Hematocrit 44.9 %      MCV 83.8 fL      MCH 29.3 pg      MCHC 35.0 g/dL      RDW 13.1 %      RDW-SD 40.1 fl      MPV 10.3 fL      Platelets 285 10*3/mm3      Neutrophil % 77.5 %      Lymphocyte % 15.6 %      Monocyte % 6.4 %      Eosinophil % 0.0 %      Basophil % 0.1 %      Immature Grans % 0.4 %      Neutrophils, Absolute 9.66 10*3/mm3      Lymphocytes, Absolute 1.95 10*3/mm3      Monocytes, Absolute 0.80 10*3/mm3      Eosinophils, Absolute 0.00 10*3/mm3      Basophils, Absolute 0.01 10*3/mm3      Immature Grans, Absolute 0.05 10*3/mm3      nRBC 0.0 /100 WBC     POC Glucose Once [[id number]]  (Abnormal) Collected: 12/09/21 0706    Specimen: Blood Updated: 12/09/21 0707     Glucose 146 mg/dL      Comment: Meter: [id number] : [id number] [facility]       POC Glucose Once [[id number]]  (Abnormal) Collected: 12/08/21 2114    Specimen: Blood  Updated: 12/08/21 2115     Glucose 181 mg/dL      Comment: Meter: [id number] : [id number] [facility]       POC Glucose Once [[id number]]  (Abnormal) Collected: 12/08/21 1628    Specimen: Blood Updated: 12/08/21 1629     Glucose 194 mg/dL      Comment: Meter: [id number] : [id number] Zhang CARVAJAL                Assessment:      Doing well postoperatively.      Plan:   1. Continue Stage 1 diet  2. Continue with ambulation and Incentive spirometry  3. Plan for d/c home    Patient was seen and examined by Dr. Polo.    Hospital Course: The patient is a very pleasant 41 y.o. male that was admitted to the hospital with morbid obesity who underwent above procedure.  Postoperatively the patient was transferred to the bariatric unit per protocol.  Patient remained afebrile and hemodynamically stable.  Patient was up ambulating well and using incentive spirometer.  Postoperatively day 1 patient was started on stage I bariatric diet and continued with good ambulation.  Lovenox was continued.  Patient was then discharged home.      Discharge medications:      Discharge Medications      New Medications      Instructions Start Date   HYDROmorphone 2 MG tablet  Commonly known as: Dilaudid   2 mg, Oral, Every 4 Hours PRN      hyoscyamine 0.125 MG SL tablet  Commonly known as: LEVSIN   125 mcg, Sublingual, Every 6 Hours PRN      ondansetron ODT 4 MG disintegrating tablet  Commonly known as: ZOFRAN-ODT   4 mg, Translingual, Every 4 Hours PRN         Changes to Medications      Instructions Start Date   folic acid-vit B6-vit B12 2.5-25-1 MG tablet tablet  Commonly known as: FOLBEE  What changed: additional instructions   1 tablet, Oral, Daily      ursodiol 300 MG capsule  Commonly known as: Actigall  What changed: additional instructions   300 mg, Oral, 2 Times Daily         Continue These Medications      Instructions Start Date   B-D ULTRAFINE III SHORT PEN 31G X 8 MM misc  Generic drug: Insulin Pen Needle   USE  AS DIRECTED EVERY DAY FOR INSULIN INJECTIONS      BASAGLAR KWIKPEN 100 UNIT/ML injection pen   20 Units, Subcutaneous, Nightly      carvedilol 25 MG tablet  Commonly known as: COREG   1 tablet, Oral, 2 Times Daily      enoxaparin 40 MG/0.4ML solution syringe  Commonly known as: Lovenox   40 mg, Subcutaneous, Every 12 Hours Scheduled, Start after surgery unless instructed otherwise      metFORMIN 1000 MG tablet  Commonly known as: GLUCOPHAGE   1,000 mg, Oral, 2 Times Daily With Meals         Stop These Medications    Chlorhexidine Gluconate Cloth 2 % pads            Discharge instructions:  Per Bariatric manual; per our protocol      Follow-up appointment: Follow up with Dr. Polo in the office as scheduled.  If not already scheduled call for appointment at [phone].

## 2021-12-14 ENCOUNTER — OFFICE VISIT (OUTPATIENT)
Dept: BARIATRICS/WEIGHT MGMT | Facility: CLINIC | Age: 41
End: 2021-12-14

## 2021-12-14 VITALS
SYSTOLIC BLOOD PRESSURE: 177 MMHG | RESPIRATION RATE: 18 BRPM | DIASTOLIC BLOOD PRESSURE: 98 MMHG | TEMPERATURE: 98.2 F | WEIGHT: 315 LBS | BODY MASS INDEX: 46.65 KG/M2 | HEIGHT: 69 IN | HEART RATE: 109 BPM

## 2021-12-14 DIAGNOSIS — E11.69 DIABETES MELLITUS TYPE 2 IN OBESE (HCC): ICD-10-CM

## 2021-12-14 DIAGNOSIS — R53.82 CHRONIC FATIGUE: ICD-10-CM

## 2021-12-14 DIAGNOSIS — G47.33 OSA (OBSTRUCTIVE SLEEP APNEA): ICD-10-CM

## 2021-12-14 DIAGNOSIS — I10 ESSENTIAL HYPERTENSION: ICD-10-CM

## 2021-12-14 DIAGNOSIS — Z71.3 DIETARY COUNSELING: ICD-10-CM

## 2021-12-14 DIAGNOSIS — K21.00 GASTROESOPHAGEAL REFLUX DISEASE WITH ESOPHAGITIS WITHOUT HEMORRHAGE: ICD-10-CM

## 2021-12-14 DIAGNOSIS — E66.9 DIABETES MELLITUS TYPE 2 IN OBESE (HCC): ICD-10-CM

## 2021-12-14 DIAGNOSIS — Z98.84 S/P LAPAROSCOPIC SLEEVE GASTRECTOMY: ICD-10-CM

## 2021-12-14 DIAGNOSIS — E66.01 OBESITY, CLASS III, BMI 40-49.9 (MORBID OBESITY) (HCC): Primary | ICD-10-CM

## 2021-12-14 PROBLEM — Z86.16 PERSONAL HISTORY OF COVID-19: Status: ACTIVE | Noted: 2021-07-19

## 2021-12-14 PROCEDURE — 99024 POSTOP FOLLOW-UP VISIT: CPT | Performed by: NURSE PRACTITIONER

## 2021-12-14 RX ORDER — LIRAGLUTIDE 6 MG/ML
INJECTION SUBCUTANEOUS
COMMUNITY
Start: 2021-12-11 | End: 2022-01-11

## 2021-12-14 RX ORDER — PANTOPRAZOLE SODIUM 40 MG/1
TABLET, DELAYED RELEASE ORAL
COMMUNITY
Start: 2021-12-11 | End: 2022-03-23

## 2021-12-14 NOTE — PROGRESS NOTES
[facility] BARIATRIC SURGERY  [address] [facility] [address]  [facility] [address]  [phone]  [address] [facility] [address]  [facility] [id number] [phone]  Dept: [phone]  12/15/2021      Rula Bardales.  [id number]  [id number]  1980  male      Chief Complaint   Patient presents with   • Post-op     1 WEEK POST OP SLEEVE       BH Post-Op Bariatric Surgery:   Rula Bardales is status post laparopscopic Laparoscopic Sleeve procedure, performed on 12/08/2021.     HPI:   Today's weight is (!) 154 kg (340 lb) pounds, today's BMI is Body mass index is 49.79 kg/m².,@ has a  loss of 31 pounds since the last visit and@ weight loss since surgery is 31 pounds. The patient reports a decreased portion size and loss of appetite.  Rula Bardales denies nausea, vomiting, reflux, dysphagia and reports tolerating regular diet. The patient c/o appropriate post-op incisional discomfort that is improving. he is doing well with protein and water intake so far. Taking their vitamins, walking and using IS. Denies fevers, chills, chest pain or shortness of air.   He did not  his prescriptions after consultation with Dr. Polo.  Has not been taking Folbee, Actigall, or Lovenox.  Stated that he has not tried protein shakes as he didn't think he could have them yet.  States he has bariatric binder but has not opened it or looked at it.       Diet and Exercise: Diet history reviewed and discussed with the patient. Weight loss/gains to date discussed with the patient. No carbonated beverage consumption and exercising regularly- walking frequently.   Supplements: multivitamins, B-12, calcium, iron, B-1 and Vitamin D.   Patient is taking blood thinner as prescribed: Did not  Lovenox  Current outpatient and discharge medications have been reconciled for the patient.  Reviewed by: LATONYA De Los Santos        Review of Systems   All other systems reviewed and are  negative.      Patient Active Problem List   Diagnosis   • Chronic fatigue   • AMIRA (obstructive sleep apnea)   • Snoring   • Multiple joint pain   • Diabetes mellitus type 2 in obese ([facility])   • Essential hypertension   • Idiopathic hypotension   • Acute renal failure ([facility])   • Shortness of breath   • Gastroesophageal reflux disease with esophagitis without hemorrhage   • Obesity, Class III, BMI 40-49.9 (morbid obesity) ([facility])   • Personal history of COVID-19   • S/P laparoscopic sleeve gastrectomy   • Dietary counseling       The following portions of the patient's history were reviewed and updated as appropriate: allergies, current medications, past medical history, past surgical history and problem list.    Vitals:    12/14/21 1011   BP: 177/98   Pulse: 109   Resp: 18   Temp: 98.2 °F (36.8 °C)       Physical Exam  Constitutional:       Appearance: Normal appearance. He is obese.   HENT:      Head: Normocephalic and atraumatic.      Mouth/Throat:      Mouth: Mucous membranes are moist.   Eyes:      General: No scleral icterus.     Extraocular Movements: Extraocular movements intact.      Conjunctiva/sclera: Conjunctivae normal.      Pupils: Pupils are equal, round, and reactive to light.   Cardiovascular:      Rate and Rhythm: Normal rate and regular rhythm.      Heart sounds: Normal heart sounds.   Pulmonary:      Effort: Pulmonary effort is normal. No respiratory distress.      Breath sounds: Normal breath sounds.   Abdominal:      General: Bowel sounds are normal. There is no distension.      Palpations: Abdomen is soft. There is no mass.      Tenderness: There is no abdominal tenderness. There is no guarding.      Comments: Incisions c/d/i   Musculoskeletal:         General: Normal range of motion.      Cervical back: Normal range of motion and neck supple.   Skin:     General: Skin is warm and dry.   Neurological:      General: No focal deficit present.      Mental Status: He is alert and oriented to person,  place, and time.   Psychiatric:         Mood and Affect: Mood normal.         Behavior: Behavior normal.         Thought Content: Thought content normal.         Judgment: Judgment normal.         Assessment:   Post-op, the patient is doing well.     Encounter Diagnoses   Name Primary?   • Obesity, Class III, BMI 40-49.9 (morbid obesity) ([facility]) Yes   • S/P laparoscopic sleeve gastrectomy    • Essential hypertension    • AMIRA (obstructive sleep apnea)    • Chronic fatigue    • Gastroesophageal reflux disease with esophagitis without hemorrhage    • Diabetes mellitus type 2 in obese ([facility])    • Dietary counseling        Plan:   START LOVENOX, ACTIGALL, FOLBEE TODAY.    Call primary care about insulin dosing.  Check blood sugars.   Given written diet progression sheet.  Discussed in detail proper postoperative diet.    Discussed with Dr. Polo  Reviewed with patient the importance of following the manual for diet progression. Increase activity as tolerated. Continue increasing daily intake of protein and water.   Return to work: the patient is to return to 3 weeks from their surgery date with no restrictions unless they develop medical problems in which we will see them back in the office. They received a note in our office today with their return to work date.  Activity restrictions: no lifting, pushing or pulling over 25lbs for 3 weeks.   Recommended patient be sure to get at least 70 grams of protein per day. Discussed with the patient the recommended amount of water per day to intake. Reviewed vitamin requirements. Be sure to do routine exercise and increase activity as tolerated. No asa, nsaids or steroids for 8 weeks if gastric sleeve procedure and lifelong if gastric bypass procedure.. Patient may use miralax as needed if necessary.     Instructions / Recommendations: dietary counseling recommended, recommended a daily protein intake of  grams, vitamin supplement(s) recommended, recommended exercising at  least 150 minutes per week, behavior modifications recommended and instructed to call the office for concerns, questions, or problems.     The patient was instructed to follow up at one month follow up appt.     The patient was counseled regarding post op bariatric manual

## 2021-12-15 PROBLEM — Z71.3 DIETARY COUNSELING: Status: ACTIVE | Noted: 2021-12-15

## 2022-01-11 ENCOUNTER — OFFICE VISIT (OUTPATIENT)
Dept: BARIATRICS/WEIGHT MGMT | Facility: CLINIC | Age: 42
End: 2022-01-11

## 2022-01-11 VITALS
SYSTOLIC BLOOD PRESSURE: 187 MMHG | WEIGHT: 315 LBS | TEMPERATURE: 97.1 F | HEIGHT: 69 IN | RESPIRATION RATE: 18 BRPM | BODY MASS INDEX: 46.65 KG/M2 | HEART RATE: 102 BPM | DIASTOLIC BLOOD PRESSURE: 121 MMHG

## 2022-01-11 DIAGNOSIS — E11.69 DIABETES MELLITUS TYPE 2 IN OBESE: ICD-10-CM

## 2022-01-11 DIAGNOSIS — Z98.84 S/P LAPAROSCOPIC SLEEVE GASTRECTOMY: ICD-10-CM

## 2022-01-11 DIAGNOSIS — G47.33 OSA (OBSTRUCTIVE SLEEP APNEA): ICD-10-CM

## 2022-01-11 DIAGNOSIS — E66.9 DIABETES MELLITUS TYPE 2 IN OBESE: ICD-10-CM

## 2022-01-11 DIAGNOSIS — I10 ESSENTIAL HYPERTENSION: ICD-10-CM

## 2022-01-11 DIAGNOSIS — E66.01 OBESITY, CLASS III, BMI 40-49.9 (MORBID OBESITY): Primary | ICD-10-CM

## 2022-01-11 DIAGNOSIS — K21.00 GASTROESOPHAGEAL REFLUX DISEASE WITH ESOPHAGITIS WITHOUT HEMORRHAGE: ICD-10-CM

## 2022-01-11 PROCEDURE — 99024 POSTOP FOLLOW-UP VISIT: CPT | Performed by: NURSE PRACTITIONER

## 2022-01-11 RX ORDER — HYDROCODONE BITARTRATE AND ACETAMINOPHEN 5; 325 MG/1; MG/1
1 TABLET ORAL EVERY 6 HOURS PRN
COMMUNITY
Start: 2022-01-10 | End: 2022-01-13

## 2022-01-11 RX ORDER — INSULIN GLARGINE 100 [IU]/ML
30 INJECTION, SOLUTION SUBCUTANEOUS NIGHTLY
COMMUNITY
Start: 2021-12-06 | End: 2022-01-11

## 2022-01-11 NOTE — PROGRESS NOTES
[facility] BARIATRIC SURGERY  [address] [facility] [address]  [facility] [address]  [phone]  [address] [id number] [phone]  Dept: [phone]  1/11/2022      Rula Bardales.  [id number]  [id number]  1980  male      Chief Complaint   Patient presents with   • Post-op     1 month post op sleeve       BH Post-Op Bariatric Surgery:   Rula Bardales is status post Laparoscopic Sleeve procedure, performed on 12/8/21     HPI:   Today's weight is (!) 143 kg (315 lb) pounds, today's BMI is Body mass index is 46.13 kg/m².,@ has a  loss of 25 pounds since the last visit and@ weight loss since surgery is 56 pounds. The patient reports a decreased portion size and loss of appetite.      Rula Bardales denies nausea, vomiting, reflux and reports right sided back and flank pain that began on January first. He was seen in the ER on 1/4 and although no kidney stone was appreciated on imaging, pain has persisted. He did follow up with his PCP a few days ago who suspects a kidney stone. He reports that he was struggling to stay hydrated at the time, but he has increased his fluid intake, urine is clear. He denies dizziness on position changes. He is drinking 1-2 protein shakes daily (premier or slimfast)     Diet and Exercise: Diet history reviewed and discussed with the patient. Weight loss/gains to date discussed with the patient. The patient states they are eating 60 grams of protein per day. He reports eating 3 meals per day, a typical portion size of 1/2 cup, eating 0 snacks per day, drinking 4-5 or more 8-oz. glasses of water per day, no carbonated beverage consumption and exercising regularly- walking most days.     Supplements: celebrate [employer] with iron, taking calcium citrate 500mg once daily.     Review of Systems   Constitutional: Positive for appetite change. Negative for fatigue and unexpected weight change.   HENT: Negative.    Eyes:  Negative.    Respiratory: Negative.    Cardiovascular: Negative.  Negative for leg swelling.   Gastrointestinal: Positive for constipation. Negative for abdominal distention, abdominal pain, diarrhea, nausea and vomiting.   Genitourinary: Negative for difficulty urinating, frequency and urgency.   Musculoskeletal: Positive for back pain (right flank and back pain).   Skin: Negative.    Psychiatric/Behavioral: Negative.    All other systems reviewed and are negative.      Patient Active Problem List   Diagnosis   • Chronic fatigue   • AMIRA (obstructive sleep apnea)   • Snoring   • Multiple joint pain   • Diabetes mellitus type 2 in obese ([facility])   • Essential hypertension   • Idiopathic hypotension   • Acute renal failure ([facility])   • Shortness of breath   • Gastroesophageal reflux disease with esophagitis without hemorrhage   • Obesity, Class III, BMI 40-49.9 (morbid obesity) ([facility])   • Personal history of COVID-19   • S/P laparoscopic sleeve gastrectomy   • Dietary counseling       Past Medical History:   Diagnosis Date   • Arthritis    • Diabetes mellitus ([facility])     TYPE 1   • History of 2019 novel coronavirus disease (COVID-19) 01/26/2021    PLACED IN COMA   • Hyperlipidemia    • Hypertension    • Right knee pain    • Sleep apnea     CPAP       The following portions of the patient's history were reviewed and updated as appropriate: allergies, current medications, past family history, past medical history, past social history, past surgical history and problem list.    Vitals:    01/11/22 1115   BP: (!) 187/121   Pulse: 102   Resp: 18   Temp: 97.1 °F (36.2 °C)       Physical Exam  Vitals and nursing note reviewed.   Constitutional:       Appearance: He is well-developed.   Neck:      Thyroid: No thyromegaly.   Cardiovascular:      Rate and Rhythm: Normal rate.   Pulmonary:      Effort: Pulmonary effort is normal. No respiratory distress.   Abdominal:      Palpations: Abdomen is soft.   Musculoskeletal:         General:  No tenderness.   Skin:     General: Skin is warm and dry.   Neurological:      Mental Status: He is alert.   Psychiatric:         Behavior: Behavior normal.         Assessment:   Post-op, the patient is struggling with right flank pain, constipation.     Encounter Diagnoses   Name Primary?   • Obesity, Class III, BMI 40-49.9 (morbid obesity) ([facility]) Yes   • S/P laparoscopic sleeve gastrectomy    • Diabetes mellitus type 2 in obese ([facility])    • Essential hypertension    • AMIRA (obstructive sleep apnea)    • Gastroesophageal reflux disease with esophagitis without hemorrhage        Plan:   Patient was advised to take mirilax as needed to prevent constipation. Continue to push fluids. He was reminded that the recommended calcium intake is 500mg TID after gastric sleeve. He was advised to only take two doses if drinking a premixed protein shake such as premier daily or one dose if he is getting two shakes in due to calcium content. We will trend iron, B vitamins, vitamin D, prealbumin levels as well as CBC and CMP today.   Encouraged patient to be sure to get plenty of lean protein per day through small frequent meals all with a protein source.   Activity restrictions: none.   Recommended patient be sure to get at least 70 grams of protein per day by eating small, frequent meals all with high lean protein choices. Be sure to limit/cut back on daily carbohydrate intake. Discussed with the patient the recommended amount of water per day to intake- half of body weight in ounces. Reviewed vitamin requirements. Be sure to do routine exercise, 150 minutes per week minimum, including both cardio and strength training.     Instructions / Recommendations: dietary counseling recommended, recommended a daily protein intake of  grams, vitamin supplement(s) recommended, recommended exercising at least 150 minutes per week, behavior modifications recommended and instructed to call the office for concerns, questions, or problems.      The patient was instructed to follow up in 2 months .      Total time spent during this encounter today was 35 minutes

## 2022-03-23 ENCOUNTER — OFFICE VISIT (OUTPATIENT)
Dept: BARIATRICS/WEIGHT MGMT | Facility: CLINIC | Age: 42
End: 2022-03-23

## 2022-03-23 VITALS
DIASTOLIC BLOOD PRESSURE: 124 MMHG | SYSTOLIC BLOOD PRESSURE: 188 MMHG | HEIGHT: 69 IN | HEART RATE: 76 BPM | RESPIRATION RATE: 18 BRPM | TEMPERATURE: 97.1 F | WEIGHT: 297 LBS | BODY MASS INDEX: 43.99 KG/M2

## 2022-03-23 DIAGNOSIS — M25.50 MULTIPLE JOINT PAIN: ICD-10-CM

## 2022-03-23 DIAGNOSIS — G47.33 OSA (OBSTRUCTIVE SLEEP APNEA): ICD-10-CM

## 2022-03-23 DIAGNOSIS — E66.01 OBESITY, CLASS III, BMI 40-49.9 (MORBID OBESITY): Primary | ICD-10-CM

## 2022-03-23 DIAGNOSIS — I10 ESSENTIAL HYPERTENSION: ICD-10-CM

## 2022-03-23 DIAGNOSIS — Z98.84 S/P LAPAROSCOPIC SLEEVE GASTRECTOMY: ICD-10-CM

## 2022-03-23 DIAGNOSIS — I95.0 IDIOPATHIC HYPOTENSION: ICD-10-CM

## 2022-03-23 DIAGNOSIS — K21.00 GASTROESOPHAGEAL REFLUX DISEASE WITH ESOPHAGITIS WITHOUT HEMORRHAGE: ICD-10-CM

## 2022-03-23 PROBLEM — Z86.16 PERSONAL HISTORY OF COVID-19: Status: RESOLVED | Noted: 2021-07-19 | Resolved: 2022-03-23

## 2022-03-23 PROCEDURE — 99213 OFFICE O/P EST LOW 20 MIN: CPT | Performed by: NURSE PRACTITIONER

## 2022-03-23 RX ORDER — INSULIN GLARGINE 100 [IU]/ML
30 INJECTION, SOLUTION SUBCUTANEOUS NIGHTLY
COMMUNITY
Start: 2022-03-07 | End: 2022-03-23

## 2022-03-23 NOTE — PROGRESS NOTES
[facility] BARIATRIC [facility] BARIATRIC SURGERY  [address] [facility] [address]  [facility] [address]  [phone]  [address] [address]  [phone]  Dept: [phone]  3/23/2022      Rula Bardales.  [id number]  [id number]  1980  male      Chief Complaint   Patient presents with   • Follow-up     3 MONTH SLEEVE FOLLOW UP       BH Post-Op Bariatric Surgery:   Rula Bardales is status post Laparoscopic Sleeve procedure, performed on 12/8/21     HPI:   Today's weight is 135 kg (297 lb) pounds, today's BMI is Body mass index is 43.49 kg/m².,@ has a  loss of 18 pounds since the last visit and@ weight loss since surgery is 74 pounds. The patient reports a decreased portion size and loss of appetite.      Rula Bardales denies nausea, vomiting, reflux and reports reflux and epigastric pain after overeating but reports that for the most part he is tolerated his dietary progression well. He reports that he is dealing with working a new shift and work and work related stress.       Diet and Exercise: Diet history reviewed and discussed with the patient. Weight loss/gains to date discussed with the patient. The patient states they are eating 60-70 grams of protein per day. He reports eating 2-3 meals per day, a typical portion size of 1/2 cup, eating 2-3 (nuts) snacks per day, drinking 5-6 or more 8-oz. glasses of water per day, no carbonated beverage consumption. He hasn't been doing much exercise currently due to his shift change. He was walking and doing some strength training.     Supplements: celebrate [employer] with iron and calcium.     Review of Systems   Constitutional: Positive for appetite change. Negative for fatigue and unexpected weight change.   HENT: Negative.    Eyes: Negative.    Respiratory: Negative.    Cardiovascular: Negative.  Negative for leg swelling.   Gastrointestinal: Negative for abdominal distention, abdominal pain, constipation, diarrhea, nausea  and vomiting.   Genitourinary: Negative for difficulty urinating, frequency and urgency.   Musculoskeletal: Negative for back pain.   Skin: Negative.    Psychiatric/Behavioral: Negative.    All other systems reviewed and are negative.      Patient Active Problem List   Diagnosis   • Chronic fatigue   • AMIRA (obstructive sleep apnea)   • Snoring   • Multiple joint pain   • Diabetes mellitus type 2 in obese ([facility])   • Essential hypertension   • Idiopathic hypotension   • Acute renal failure ([facility])   • Shortness of breath   • Gastroesophageal reflux disease with esophagitis without hemorrhage   • Obesity, Class III, BMI 40-49.9 (morbid obesity) ([facility])   • S/P laparoscopic sleeve gastrectomy   • Dietary counseling       Past Medical History:   Diagnosis Date   • Arthritis    • Diabetes mellitus ([facility])     TYPE 1   • History of 2019 novel coronavirus disease (COVID-19) 01/26/2021    PLACED IN COMA   • Hyperlipidemia    • Hypertension    • Right knee pain    • Sleep apnea     CPAP       The following portions of the patient's history were reviewed and updated as appropriate: allergies, current medications, past family history, past medical history, past social history, past surgical history and problem list.    Vitals:    03/23/22 1502   BP: (!) 188/124   Pulse: 76   Resp: 18   Temp: 97.1 °F (36.2 °C)       Physical Exam  Vitals and nursing note reviewed.   Constitutional:       Appearance: He is well-developed.   Neck:      Thyroid: No thyromegaly.   Cardiovascular:      Rate and Rhythm: Normal rate.   Pulmonary:      Effort: Pulmonary effort is normal. No respiratory distress.   Abdominal:      Palpations: Abdomen is soft.   Musculoskeletal:         General: No tenderness.   Skin:     General: Skin is warm and dry.   Neurological:      Mental Status: He is alert.   Psychiatric:         Behavior: Behavior normal.         Assessment:   Post-op, the patient is trying to get adjusted to his third shift schedule.     Encounter  Diagnoses   Name Primary?   • Obesity, Class III, BMI 40-49.9 (morbid obesity) ([facility]) Yes   • Essential hypertension    • Idiopathic hypotension    • Multiple joint pain    • AMIRA (obstructive sleep apnea)    • Gastroesophageal reflux disease with esophagitis without hemorrhage        Plan:   Encouraged patient to be sure to get plenty of lean protein per day through small frequent meals all with a protein source.   Activity restrictions: none.   Recommended patient be sure to get at least 70 grams of protein per day by eating small, frequent meals all with high lean protein choices. Be sure to limit/cut back on daily carbohydrate intake. Discussed with the patient the recommended amount of water per day to intake- half of body weight in ounces. Reviewed vitamin requirements. Be sure to do routine exercise, 150 minutes per week minimum, including both cardio and strength training.     Instructions / Recommendations: dietary counseling recommended, recommended a daily protein intake of  grams, vitamin supplement(s) recommended, recommended exercising at least 150 minutes per week, behavior modifications recommended and instructed to call the office for concerns, questions, or problems.     The patient was instructed to follow up in 3 months .      Total time spent during this encounter today was 25 minutes

## 2022-06-22 NOTE — DISCHARGE INSTRUCTIONS
For the next 24 hours patient needs to be with a responsible adult.    For 24 hours DO NOT drive, operate machinery, appliances, drink alcohol, make important decisions or sign legal documents.    Start with a light or bland diet if you are feeling sick to your stomach otherwise advance to regular diet as tolerated.    Follow recommendations on procedure report if provided by your doctor.    Call Dr KC for problems [phone]    Problems may include but not limited to: large amounts of bleeding, trouble breathing, repeated vomiting, severe unrelieved pain, fever or chills.         From: Maciej Chávez  To: Dr. Rocío Cariast: 6/22/2022 11:23 AM EDT  Subject: Eliquis refill    I purchase my eliquis from [facility] The pharmacy has sent several requests for refill with no response. If you could refill this it would be appreciated. It goes to [facility]. Phone # [phone]. THe fax number is [phone]. Thank you in advance.       Marisa Whatley

## 2022-06-23 ENCOUNTER — OFFICE VISIT (OUTPATIENT)
Dept: BARIATRICS/WEIGHT MGMT | Facility: CLINIC | Age: 42
End: 2022-06-23

## 2022-06-23 VITALS
WEIGHT: 280 LBS | HEIGHT: 69 IN | RESPIRATION RATE: 18 BRPM | TEMPERATURE: 97.1 F | SYSTOLIC BLOOD PRESSURE: 196 MMHG | HEART RATE: 74 BPM | BODY MASS INDEX: 41.47 KG/M2 | DIASTOLIC BLOOD PRESSURE: 116 MMHG

## 2022-06-23 DIAGNOSIS — Z98.84 S/P LAPAROSCOPIC SLEEVE GASTRECTOMY: ICD-10-CM

## 2022-06-23 DIAGNOSIS — G47.33 OSA (OBSTRUCTIVE SLEEP APNEA): ICD-10-CM

## 2022-06-23 DIAGNOSIS — K21.00 GASTROESOPHAGEAL REFLUX DISEASE WITH ESOPHAGITIS WITHOUT HEMORRHAGE: ICD-10-CM

## 2022-06-23 DIAGNOSIS — E66.9 DIABETES MELLITUS TYPE 2 IN OBESE: ICD-10-CM

## 2022-06-23 DIAGNOSIS — I10 ESSENTIAL HYPERTENSION: ICD-10-CM

## 2022-06-23 DIAGNOSIS — E11.69 DIABETES MELLITUS TYPE 2 IN OBESE: ICD-10-CM

## 2022-06-23 DIAGNOSIS — E66.01 OBESITY, CLASS III, BMI 40-49.9 (MORBID OBESITY): Primary | ICD-10-CM

## 2022-06-23 DIAGNOSIS — M25.50 MULTIPLE JOINT PAIN: ICD-10-CM

## 2022-06-23 PROCEDURE — 99213 OFFICE O/P EST LOW 20 MIN: CPT | Performed by: NURSE PRACTITIONER

## 2022-06-23 RX ORDER — LOSARTAN POTASSIUM 50 MG/1
1 TABLET ORAL DAILY
COMMUNITY
Start: 2022-04-25 | End: 2023-02-02

## 2022-06-23 NOTE — PROGRESS NOTES
[facility] BARIATRIC [facility] BARIATRIC SURGERY  [address] [facility] [address]  [facility] [id number] [phone]  [address] [facility] [address]  [facility] [id number] [phone]  Dept: [phone]  6/23/2022      Rula Bardales.  [id number]  [id number]  1980  male      Chief Complaint   Patient presents with   • Follow-up     6 month sleeve follow up       BH Post-Op Bariatric Surgery:   Rula Bardales is status post Laparoscopic Sleeve procedure, performed on 12/8/2021     HPI:   Today's weight is 127 kg (280 lb) pounds, today's BMI is Body mass index is 41 kg/m².,@ has a  loss of 17 pounds since the last visit and@ weight loss since surgery is 91 pounds. The patient reports a decreased portion size and loss of appetite.      Rula Bardales denies nausea and reports that he is struggling with constipation. Reports having a BM every 3-4 days but straining when he does go. He reports vomiting or regurgitation once a month or so. He reports regurgitation and reflux with too much water.      Diet and Exercise: Diet history reviewed and discussed with the patient. Weight loss/gains to date discussed with the patient. The patient states they are eating  grams of protein per day. He reports eating 2-3 meals per day, a typical portion size of 1/2 cup, eating 1 snacks per day, drinking 5-6 or more 8-oz. glasses of water per day, no carbonated beverage consumption and exercising regularly- walking 5 days per week. He has gotten away from strength training due to weight gain he was seeing with it.     He is getting all protein from real foods, nut butters chicken, fish, some burger meats. Will have oatmeal or eggs.     Supplements: celebrate [employer] with iron and calcium.     Review of Systems   Constitutional: Positive for appetite change. Negative for fatigue and unexpected weight change.   HENT: Negative.    Eyes: Negative.    Respiratory: Negative.    Cardiovascular: Negative.  Negative for leg  swelling.   Gastrointestinal: Positive for vomiting. Negative for abdominal distention, abdominal pain, constipation, diarrhea and nausea.   Genitourinary: Negative for difficulty urinating, frequency and urgency.   Musculoskeletal: Negative for back pain.   Skin: Negative.    Psychiatric/Behavioral: Negative.    All other systems reviewed and are negative.      Patient Active Problem List   Diagnosis   • Chronic fatigue   • AMIRA (obstructive sleep apnea)   • Snoring   • Multiple joint pain   • Diabetes mellitus type 2 in obese ([facility])   • Essential hypertension   • Idiopathic hypotension   • Acute renal failure ([facility])   • Shortness of breath   • Gastroesophageal reflux disease with esophagitis without hemorrhage   • Obesity, Class III, BMI 40-49.9 (morbid obesity) ([facility])   • S/P laparoscopic sleeve gastrectomy   • Dietary counseling       Past Medical History:   Diagnosis Date   • Arthritis    • Diabetes mellitus ([facility])     TYPE 1   • History of 2019 novel coronavirus disease (COVID-19) 01/26/2021    PLACED IN COMA   • Hyperlipidemia    • Hypertension    • Right knee pain    • Sleep apnea     CPAP       The following portions of the patient's history were reviewed and updated as appropriate: allergies, current medications, past family history, past medical history, past social history, past surgical history and problem list.    Vitals:    06/23/22 1511   BP: (!) 196/116   Pulse: 74   Resp: 18   Temp: 97.1 °F (36.2 °C)   Patient is out of his coreg and planning to pick it up today.    Physical Exam  Vitals and nursing note reviewed.   Constitutional:       Appearance: He is well-developed.   Neck:      Thyroid: No thyromegaly.   Cardiovascular:      Rate and Rhythm: Normal rate.   Pulmonary:      Effort: Pulmonary effort is normal. No respiratory distress.   Abdominal:      Palpations: Abdomen is soft.   Musculoskeletal:         General: No tenderness.   Skin:     General: Skin is warm and dry.   Neurological:      Mental  Status: He is alert.   Psychiatric:         Behavior: Behavior normal.         Assessment:   Post-op, the patient is doing well.     Encounter Diagnoses   Name Primary?   • Obesity, Class III, BMI 40-49.9 (morbid obesity) ([facility]) Yes   • S/P laparoscopic sleeve gastrectomy    • Essential hypertension    • Diabetes mellitus type 2 in obese ([facility])    • AMIRA (obstructive sleep apnea)    • Gastroesophageal reflux disease with esophagitis without hemorrhage    • Multiple joint pain        Plan:   Encouraged patient to be sure to get plenty of lean protein per day through small frequent meals all with a protein source.   Activity restrictions: none.   Recommended patient be sure to get at least 70 grams of protein per day by eating small, frequent meals all with high lean protein choices. Be sure to limit/cut back on daily carbohydrate intake. Discussed with the patient the recommended amount of water per day to intake- half of body weight in ounces. Reviewed vitamin requirements. Be sure to do routine exercise, 150 minutes per week minimum, including both cardio and strength training.     Instructions / Recommendations: dietary counseling recommended, recommended a daily protein intake of  grams, vitamin supplement(s) recommended, recommended exercising at least 150 minutes per week, behavior modifications recommended and instructed to call the office for concerns, questions, or problems.     The patient was instructed to follow up in 3 months .     . Total time spent during this encounter today was 25 minutes

## 2022-08-22 ENCOUNTER — PREP FOR SURGERY (OUTPATIENT)
Dept: OTHER | Facility: HOSPITAL | Age: 42
End: 2022-08-22

## 2022-08-22 DIAGNOSIS — R13.19 OTHER DYSPHAGIA: Primary | ICD-10-CM

## 2022-08-22 RX ORDER — SODIUM CHLORIDE, SODIUM LACTATE, POTASSIUM CHLORIDE, CALCIUM CHLORIDE 600; 310; 30; 20 MG/100ML; MG/100ML; MG/100ML; MG/100ML
30 INJECTION, SOLUTION INTRAVENOUS CONTINUOUS
Status: CANCELLED | OUTPATIENT
Start: 2022-08-23

## 2022-08-23 ENCOUNTER — ANESTHESIA EVENT (OUTPATIENT)
Dept: GASTROENTEROLOGY | Facility: HOSPITAL | Age: 42
End: 2022-08-23

## 2022-08-23 ENCOUNTER — ANESTHESIA (OUTPATIENT)
Dept: GASTROENTEROLOGY | Facility: HOSPITAL | Age: 42
End: 2022-08-23

## 2022-08-23 ENCOUNTER — HOSPITAL ENCOUNTER (OUTPATIENT)
Facility: HOSPITAL | Age: 42
Setting detail: HOSPITAL OUTPATIENT SURGERY
Discharge: HOME | End: 2022-08-23
Attending: SURGERY | Admitting: SURGERY

## 2022-08-23 VITALS
WEIGHT: 257.6 LBS | BODY MASS INDEX: 36.88 KG/M2 | RESPIRATION RATE: 16 BRPM | OXYGEN SATURATION: 100 % | SYSTOLIC BLOOD PRESSURE: 145 MMHG | HEIGHT: 70 IN | HEART RATE: 58 BPM | DIASTOLIC BLOOD PRESSURE: 95 MMHG | TEMPERATURE: 98 F

## 2022-08-23 DIAGNOSIS — R13.19 OTHER DYSPHAGIA: ICD-10-CM

## 2022-08-23 LAB — GLUCOSE BLDC GLUCOMTR-MCNC: 88 MG/DL (ref 70–130)

## 2022-08-23 PROCEDURE — 25010000002 PROPOFOL 10 MG/ML EMULSION: Performed by: NURSE ANESTHETIST, CERTIFIED REGISTERED

## 2022-08-23 PROCEDURE — 43245 EGD DILATE STRICTURE: CPT | Performed by: SURGERY

## 2022-08-23 PROCEDURE — C1726 CATH, BAL DIL, NON-VASCULAR: HCPCS | Performed by: SURGERY

## 2022-08-23 PROCEDURE — 82962 GLUCOSE BLOOD TEST: CPT

## 2022-08-23 RX ORDER — PROPOFOL 10 MG/ML
VIAL (ML) INTRAVENOUS AS NEEDED
Status: DISCONTINUED | OUTPATIENT
Start: 2022-08-23 | End: 2022-08-23 | Stop reason: SURG

## 2022-08-23 RX ORDER — SODIUM CHLORIDE, SODIUM LACTATE, POTASSIUM CHLORIDE, CALCIUM CHLORIDE 600; 310; 30; 20 MG/100ML; MG/100ML; MG/100ML; MG/100ML
30 INJECTION, SOLUTION INTRAVENOUS CONTINUOUS
Status: DISCONTINUED | OUTPATIENT
Start: 2022-08-23 | End: 2022-08-23 | Stop reason: HOSPADM

## 2022-08-23 RX ADMIN — PROPOFOL 50 MG: 10 INJECTION, EMULSION INTRAVENOUS at 07:07

## 2022-08-23 RX ADMIN — PROPOFOL 150 MG: 10 INJECTION, EMULSION INTRAVENOUS at 07:04

## 2022-08-23 RX ADMIN — PROPOFOL 50 MG: 10 INJECTION, EMULSION INTRAVENOUS at 07:05

## 2022-08-23 RX ADMIN — PROPOFOL 50 MG: 10 INJECTION, EMULSION INTRAVENOUS at 07:06

## 2022-08-23 RX ADMIN — SODIUM CHLORIDE, POTASSIUM CHLORIDE, SODIUM LACTATE AND CALCIUM CHLORIDE 30 ML/HR: 600; 310; 30; 20 INJECTION, SOLUTION INTRAVENOUS at 06:53

## 2022-08-23 NOTE — OP NOTE
Surgeon: Farzad Polo Jr., M.D.    Preoperative Diagnosis: Dysphagia status post laparoscopic sleeve gastrectomy    Postoperative Diagnosis: Gastritis    Procedure Performed: Transoral esophagogastroduodenoscopy with 18-20 balloon dilatation of pylorus    Indications: 42-year-old gentleman status post laparoscopic sleeve gastrectomy approximately 6 months ago with complaints of dehydration.  Patient had high blood pressure medications changed and not keeping up with his fluid intake.    Procedure:     The procedure, indications, preparation and potential, patient were explained to the patient, who indicated understanding and signed the corresponding consent forms.  The patient was identified, taken to the endoscopy suite, and placed on the left side down decubitus position.  The patient underwent a MAC anesthesia and was appropriately monitored through the case by the anesthesia personnel using continuous pulse oximetry, blood pressure, and cardiac monitoring.  A bite block was placed.  After adequate IV sedation and using a transoral technique a lubed flexible endoscope was placed in the hypopharynx and advanced to the second portion of the duodenum.  The pylorus was mildly strictured and the scope had to be advanced with some pressure into the duodenum.  The scope was then withdrawn back into the gastric sleeve.  There was no stricture noted in the gastric sleeve unless dictated below.  No polyps or ulcers were seen unless dictated below.  The scope was then withdrawn back into the esophagus.  The Z line was regular and no erosive esophagitis seen unless dictated below.  Scope was then advanced back down into the antrum.  A 18-20 balloon catheter was then advanced across the pylorus and taken up in sequence and each level held for approximately 1 minute.  The catheter was deflated and removed.  The pylorus dilated up nicely.  The scope was then completely withdrawn after decompressing the stomach.  The patient  tolerated the procedure well and left the endoscopy suite in stable condition.    The pylorus opened up nicely after dilatation.  The sleeve was of normal size without stricture or dilatation.  Patchy erythema noted in the antrum.  The Z-line was regular and no erosive esophagitis noted.    Recommendations:     Follow-up in office as scheduled

## 2022-08-23 NOTE — ANESTHESIA POSTPROCEDURE EVALUATION
Patient: Rula Bardales    Procedure Summary     Date: 08/23/22 Room / Location: [facility] ENDOSCOPY 8 /  [facility] END[facility]COPY    Anesthesia Start: 0659 Anesthesia Stop: 0719    Procedure: ESOPHAGOGASTRODUODENOSCOPY WITH 18-19-20mm BALLOON DILATATION (N/A Esophagus) Diagnosis:       Other dysphagia      (Other dysphagia [R13.19])    Surgeons: Farzad Polo Jr., MD Provider: Neil Morrison MD    Anesthesia Type: MAC ASA Status: 3          Anesthesia Type: MAC    Vitals  Vitals Value Taken Time   /92 08/23/22 0715   Temp 36.7 °C (98 °F) 08/23/22 0715   Pulse 86 08/23/22 0715   Resp 16 08/23/22 0715   SpO2 100 % 08/23/22 0715           Post Anesthesia Care and Evaluation    Patient location during evaluation: PHASE II  Patient participation: complete - patient participated  Level of consciousness: awake and alert  Pain management: adequate    Airway patency: patent  Anesthetic complications: No anesthetic complications  PONV Status: none  Cardiovascular status: acceptable and hemodynamically stable  Respiratory status: acceptable, nonlabored ventilation and spontaneous ventilation  Hydration status: acceptable

## 2022-08-23 NOTE — H&P
[facility] BARIATRIC [facility] BARIATRIC SURGERY  [address] [facility] [address]  [facility] [id number] [phone]  [address] [facility] [address]  [facility] [address] [phone]  Dept: [phone]  6/23/2022        Rula Bardales.  [id number]  [id number]  1980  male             Chief Complaint   Patient presents with   • Follow-up       6 month sleeve follow up         BH Post-Op Bariatric Surgery:   Rula Bardales is status post Laparoscopic Sleeve procedure, performed on 12/8/2021      HPI:   Today's weight is 127 kg (280 lb) pounds, today's BMI is Body mass index is 41 kg/m².,@ has a  loss of 17 pounds since the last visit and@ weight loss since surgery is 91 pounds. The patient reports a decreased portion size and loss of appetite.       Rula Bardales denies nausea and reports that he is struggling with constipation. Reports having a BM every 3-4 days but straining when he does go. He reports vomiting or regurgitation once a month or so. He reports regurgitation and reflux with too much water.      Diet and Exercise: Diet history reviewed and discussed with the patient. Weight loss/gains to date discussed with the patient. The patient states they are eating  grams of protein per day. He reports eating 2-3 meals per day, a typical portion size of 1/2 cup, eating 1 snacks per day, drinking 5-6 or more 8-oz. glasses of water per day, no carbonated beverage consumption and exercising regularly- walking 5 days per week. He has gotten away from strength training due to weight gain he was seeing with it.      He is getting all protein from real foods, nut butters chicken, fish, some burger meats. Will have oatmeal or eggs.      Supplements: celebrate [employer] with iron and calcium.      Review of Systems   Constitutional: Positive for appetite change. Negative for fatigue and unexpected weight change.   HENT: Negative.    Eyes: Negative.    Respiratory: Negative.    Cardiovascular: Negative.   Negative for leg swelling.   Gastrointestinal: Positive for vomiting. Negative for abdominal distention, abdominal pain, constipation, diarrhea and nausea.   Genitourinary: Negative for difficulty urinating, frequency and urgency.   Musculoskeletal: Negative for back pain.   Skin: Negative.    Psychiatric/Behavioral: Negative.    All other systems reviewed and are negative.            Patient Active Problem List   Diagnosis   • Chronic fatigue   • AMIRA (obstructive sleep apnea)   • Snoring   • Multiple joint pain   • Diabetes mellitus type 2 in obese ([facility])   • Essential hypertension   • Idiopathic hypotension   • Acute renal failure ([facility])   • Shortness of breath   • Gastroesophageal reflux disease with esophagitis without hemorrhage   • Obesity, Class III, BMI 40-49.9 (morbid obesity) ([facility])   • S/P laparoscopic sleeve gastrectomy   • Dietary counseling         Medical History        Past Medical History:   Diagnosis Date   • Arthritis     • Diabetes mellitus ([facility])       TYPE 1   • History of 2019 novel coronavirus disease (COVID-19) 01/26/2021     PLACED IN COMA   • Hyperlipidemia     • Hypertension     • Right knee pain     • Sleep apnea       CPAP            The following portions of the patient's history were reviewed and updated as appropriate: allergies, current medications, past family history, past medical history, past social history, past surgical history and problem list.         Vitals:     06/23/22 1511   BP: (!) 196/116   Pulse: 74   Resp: 18   Temp: 97.1 °F (36.2 °C)   Patient is out of his coreg and planning to pick it up today.     Physical Exam  Vitals and nursing note reviewed.   Constitutional:       Appearance: He is well-developed.   Neck:      Thyroid: No thyromegaly.   Cardiovascular:      Rate and Rhythm: Normal rate.   Pulmonary:      Effort: Pulmonary effort is normal. No respiratory distress.   Abdominal:      Palpations: Abdomen is soft.   Musculoskeletal:         General: No tenderness.    Skin:     General: Skin is warm and dry.   Neurological:      Mental Status: He is alert.   Psychiatric:         Behavior: Behavior normal.            Assessment:   Post-op, the patient is doing well.           Encounter Diagnoses   Name Primary?   • Obesity, Class III, BMI 40-49.9 (morbid obesity) ([facility]) Yes   • S/P laparoscopic sleeve gastrectomy     • Essential hypertension     • Diabetes mellitus type 2 in obese ([facility])     • AMIRA (obstructive sleep apnea)     • Gastroesophageal reflux disease with esophagitis without hemorrhage     • Multiple joint pain           Plan:   Patient with difficulty staying hydrated after change in his medications starting in July.  He feels like he is having a hard time staying hydrated.  Patient now presents for elective upper endoscopy with dilatation of the pylorus.  The risks and benefits of the procedure were discussed with the patient in detail and all questions were answered.  Possibility of perforation, bleeding, aspiration, anoxic brain injury, respiratory and/or cardiac arrest and death were discussed.  Consent will be signed and witnessed.    Encouraged patient to be sure to get plenty of lean protein per day through small frequent meals all with a protein source.   Activity restrictions: none.   Recommended patient be sure to get at least 70 grams of protein per day by eating small, frequent meals all with high lean protein choices. Be sure to limit/cut back on daily carbohydrate intake. Discussed with the patient the recommended amount of water per day to intake- half of body weight in ounces. Reviewed vitamin requirements. Be sure to do routine exercise, 150 minutes per week minimum, including both cardio and strength training.      Instructions / Recommendations: dietary counseling recommended, recommended a daily protein intake of  grams, vitamin supplement(s) recommended, recommended exercising at least 150 minutes per week, behavior modifications  recommended and instructed to call the office for concerns, questions, or problems.

## 2022-08-23 NOTE — ANESTHESIA PREPROCEDURE EVALUATION
Anesthesia Evaluation     Patient summary reviewed and Nursing notes reviewed   NPO Solid Status: > 8 hours  NPO Liquid Status: > 4 hours           Airway   Mallampati: III  TM distance: >3 FB  Neck ROM: full  Possible difficult intubation  Dental - normal exam     Pulmonary - normal exam   (+) a smoker Former, shortness of breath, sleep apnea on CPAP,     ROS comment: Hx COVID in 2019, placed in coma  Cardiovascular - normal exam    ECG reviewed    (+) hypertension 2 medications or greater, hyperlipidemia,       Neuro/Psych  GI/Hepatic/Renal/Endo    (+) obesity, morbid obesity, GERD,  renal disease, diabetes mellitus type 2,     ROS Comment: Hx gastric sleeve 12/21, has lost over 100 lbs    Musculoskeletal     Abdominal   (+) obese,    Substance History      OB/GYN          Other   arthritis,                    Anesthesia Plan    ASA 3     MAC     intravenous induction     Anesthetic plan, risks, benefits, and alternatives have been provided, discussed and informed consent has been obtained with: patient.    Plan discussed with CRNA.        CODE STATUS:

## 2023-01-12 NOTE — PROGRESS NOTES
Physical Therapy Daily Progress Note            Subjective   Hurting a lot    Objective   See Exercise, Manual, and Modality Logs for complete treatment.       Assessment/Plan    Limited aggie for exercises, matthew with lateral step up even though very shallow step.  Relief with modalities.  Labored breathing during seated ex.    Gradual progression of ex, matthew WB as aggie             Manual Therapy:    0     mins  [id number];  Therapeutic Exercise:    24     mins  [id number];       Timed Treatment:   24   mins   Total Treatment:     39   mins    Hoa Savage PT  Physical Therapist   independent independent

## 2023-02-02 ENCOUNTER — OFFICE VISIT (OUTPATIENT)
Dept: BARIATRICS/WEIGHT MGMT | Facility: CLINIC | Age: 43
End: 2023-02-02
Payer: COMMERCIAL

## 2023-02-02 VITALS
TEMPERATURE: 97.7 F | HEIGHT: 69 IN | BODY MASS INDEX: 39.99 KG/M2 | WEIGHT: 270 LBS | SYSTOLIC BLOOD PRESSURE: 141 MMHG | HEART RATE: 72 BPM | DIASTOLIC BLOOD PRESSURE: 89 MMHG

## 2023-02-02 DIAGNOSIS — E66.9 DIABETES MELLITUS TYPE 2 IN OBESE: ICD-10-CM

## 2023-02-02 DIAGNOSIS — G47.33 OSA (OBSTRUCTIVE SLEEP APNEA): ICD-10-CM

## 2023-02-02 DIAGNOSIS — E66.9 OBESITY, CLASS II, BMI 35-39.9: Primary | ICD-10-CM

## 2023-02-02 DIAGNOSIS — K21.00 GASTROESOPHAGEAL REFLUX DISEASE WITH ESOPHAGITIS WITHOUT HEMORRHAGE: ICD-10-CM

## 2023-02-02 DIAGNOSIS — Z98.84 S/P LAPAROSCOPIC SLEEVE GASTRECTOMY: ICD-10-CM

## 2023-02-02 DIAGNOSIS — I10 ESSENTIAL HYPERTENSION: ICD-10-CM

## 2023-02-02 DIAGNOSIS — E11.69 DIABETES MELLITUS TYPE 2 IN OBESE: ICD-10-CM

## 2023-02-02 PROBLEM — E66.812 OBESITY, CLASS II, BMI 35-39.9: Status: ACTIVE | Noted: 2023-02-02

## 2023-02-02 PROBLEM — E66.01 OBESITY, CLASS III, BMI 40-49.9 (MORBID OBESITY): Status: RESOLVED | Noted: 2021-11-04 | Resolved: 2023-02-02

## 2023-02-02 PROCEDURE — 99214 OFFICE O/P EST MOD 30 MIN: CPT | Performed by: NURSE PRACTITIONER

## 2023-02-02 RX ORDER — TERBINAFINE HYDROCHLORIDE 250 MG/1
1 TABLET ORAL DAILY
COMMUNITY
Start: 2022-12-09

## 2023-02-02 RX ORDER — CHLORTHALIDONE 25 MG/1
25 TABLET ORAL DAILY
COMMUNITY
Start: 2022-11-04

## 2023-02-02 RX ORDER — AMLODIPINE BESYLATE AND ATORVASTATIN CALCIUM 10; 40 MG/1; MG/1
1 TABLET, FILM COATED ORAL DAILY
COMMUNITY
Start: 2023-01-12 | End: 2023-04-12

## 2023-02-02 RX ORDER — CARVEDILOL 6.25 MG/1
6.25 TABLET ORAL 2 TIMES DAILY
COMMUNITY
Start: 2022-09-22 | End: 2023-05-12

## 2023-02-02 RX ORDER — LOSARTAN POTASSIUM 25 MG/1
25 TABLET ORAL DAILY
COMMUNITY
Start: 2023-01-12 | End: 2024-01-12

## 2023-02-02 NOTE — PROGRESS NOTES
[facility] BARIATRIC [facility] BARIATRIC SURGERY  [address] [facility] [address]  [facility] [address]  [phone]  [address] [facility] [address]  [facility] [id number] [phone]  Dept: [phone]  2/2/2023      Rula Bardales.  [id number]  [id number]  1980  male      Chief Complaint   Patient presents with   • Follow-up     13 mo fup sleeve       BH Post-Op Bariatric Surgery:   Rula Bardales is status post Laparoscopic Sleeve procedure, performed on 12/8/21. He does report more sugar cravings lately and getting dizzy and having diaphoresis if he goes too long without eating. He does report more constipation.     HPI:   Today's weight is 122 kg (270 lb) pounds, today's BMI is Body mass index is 39.54 kg/m².,@ has a  loss of 10 pounds since the last visit and@ weight loss since surgery is 101 pounds. The patient reports a decreased portion size and loss of appetite.       Diet and Exercise: Diet history reviewed and discussed with the patient. Weight loss/gains to date discussed with the patient. The patient states they are eating 60 grams of protein per day. He reports eating 3 meals per day, a typical portion size of 1/2 cup, eating 1-2 snacks per day, drinking 5-6 or more 8-oz. glasses of water per day, no carbonated beverage consumption. He denies regular exercise for the last couple of months, he has had a schedule change at work and has picked up a second day. He is very active with one of his jobs. 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     He reports that he has been building more muscle. He has gotten away from lifting heavy and doing more body weight.     Supplements: none     Review of Systems   Constitutional: Positive for appetite change and fatigue. Negative for unexpected weight change.   HENT: Negative.    Eyes: Negative.    Respiratory: Negative.    Cardiovascular: Negative.  Negative for leg swelling.   Gastrointestinal:  Positive for constipation. Negative for abdominal distention, abdominal pain, diarrhea, nausea and vomiting.   Genitourinary: Negative for difficulty urinating, frequency and urgency.   Musculoskeletal: Negative for back pain.   Skin: Negative.    Psychiatric/Behavioral: Negative.    All other systems reviewed and are negative.      Patient Active Problem List   Diagnosis   • Chronic fatigue   • AMIRA (obstructive sleep apnea)   • Snoring   • Multiple joint pain   • Diabetes mellitus type 2 in obese (HCC)   • Essential hypertension   • Idiopathic hypotension   • Acute renal failure (HCC)   • Shortness of breath   • Gastroesophageal reflux disease with esophagitis without hemorrhage   • S/P laparoscopic sleeve gastrectomy   • Dietary counseling   • Other dysphagia   • Obesity, Class II, BMI 35-39.9       Past Medical History:   Diagnosis Date   • Arthritis    • Diabetes mellitus (HCC)     type 2   • History of 2019 novel coronavirus disease (COVID-19) 01/26/2021    PLACED IN COMA   • Hyperlipidemia    • Hypertension    • Right knee pain    • Sleep apnea     CPAP       The following portions of the patient's history were reviewed and updated as appropriate: allergies, current medications, past family history, past medical history, past social history, past surgical history and problem list.    Vitals:    02/02/23 0736   BP: 141/89   Pulse: 72   Temp: 97.7 °F (36.5 °C)       Physical Exam  Vitals and nursing note reviewed.   Constitutional:       Appearance: He is well-developed.   Neck:      Thyroid: No thyromegaly.   Cardiovascular:      Rate and Rhythm: Normal rate.   Pulmonary:      Effort: Pulmonary effort is normal. No respiratory distress.   Abdominal:      Palpations: Abdomen is soft.   Musculoskeletal:         General: No tenderness.   Skin:     General: Skin is warm and dry.   Neurological:      Mental Status: He is alert.   Psychiatric:         Behavior: Behavior normal.         Assessment:   Post-op, the  patient is doing well, but encouraged to decrease simple carb intake and increase exercise.     Encounter Diagnoses   Name Primary?   • Obesity, Class II, BMI 35-39.9 Yes   • S/P laparoscopic sleeve gastrectomy    • Essential hypertension    • Diabetes mellitus type 2 in obese (HCC)    • AMIRA (obstructive sleep apnea)    • Gastroesophageal reflux disease with esophagitis without hemorrhage        Plan:   Discussed increasing non-exercise physical activity throughout the work day. Increase light strength training. Add a fiber supplement to help prevent constipation. Take 2-3 hour breaks between meals and snacks but also avoid going more than 4 hours without eating to avoid hypoglycemic episodes. Will trend vitamin D, iron, b vitamin level and prealbumin to help pinpoint cause of fatigue.   Encouraged patient to be sure to get plenty of lean protein per day through small frequent meals all with a protein source.   Activity restrictions: none.   Recommended patient be sure to get at least 70 grams of protein per day by eating small, frequent meals all with high lean protein choices. Be sure to limit/cut back on daily carbohydrate intake. Discussed with the patient the recommended amount of water per day to intake- half of body weight in ounces. Reviewed vitamin requirements. Be sure to do routine exercise, 150 minutes per week minimum, including both cardio and strength training.     Instructions / Recommendations: dietary counseling recommended, recommended a daily protein intake of  grams, vitamin supplement(s) recommended, recommended exercising at least 150 minutes per week, behavior modifications recommended and instructed to call the office for concerns, questions, or problems.     The patient was instructed to follow up in 6 months .    Total time spent during this encounter today was 35 minutes

## (undated) DEVICE — GLV SURG SENSICARE PI PF LF 8.5 GRN STRL

## (undated) DEVICE — TUBING, SUCTION, 1/4" X 10', STRAIGHT: Brand: MEDLINE

## (undated) DEVICE — MARKR SKIN W/RULR AND LBL

## (undated) DEVICE — FRCP BX RADJAW4 NDL 2.8 240CM LG OG BX40

## (undated) DEVICE — MSK PROC CURAPLEX O2 2/ADAPT 7FT

## (undated) DEVICE — BITEBLOCK OMNI BLOC

## (undated) DEVICE — CONTAINER,SPECIMEN,OR STERILE,4OZ: Brand: MEDLINE

## (undated) DEVICE — NDL HYPO PRECISIONGLIDE REG 20G 1 1/2

## (undated) DEVICE — KT ORCA ORCAPOD DISP STRL

## (undated) DEVICE — SENSR O2 OXIMAX FNGR A/ 18IN NONSTR

## (undated) DEVICE — ADAPT CLN BIOGUARD AIR/H2O DISP

## (undated) DEVICE — 500ML,PRESSURE INFUSER W/STOPCOCK: Brand: MEDLINE

## (undated) DEVICE — LN SMPL CO2 SHTRM SD STREAM W/M LUER

## (undated) DEVICE — PATIENT RETURN ELECTRODE, SINGLE-USE, CONTACT QUALITY MONITORING, ADULT, WITH 9FT CORD, FOR PATIENTS WEIGING OVER 33LBS. (15KG): Brand: MEGADYNE

## (undated) DEVICE — Device: Brand: STANDARD BOUGIE, 38FR

## (undated) DEVICE — TROC STANDARDTROCAR FOR/TITANSGS 19MM DISP STRL

## (undated) DEVICE — PK OSC LAP SLV 40

## (undated) DEVICE — APL DUPLOSPRAYER MIS 40CM

## (undated) DEVICE — LAPAROSCOPIC DISSECTOR: Brand: DEROYAL

## (undated) DEVICE — ENSEAL LAPAROSCOPIC TISSUE SEALER G2 ARTICULATING CURVED JAW FOR USE WITH G2 GENERATOR 5MM DIAMETER 45CM SHAFT LENGTH: Brand: ENSEAL

## (undated) DEVICE — VISUALIZATION SYSTEM: Brand: CLEARIFY

## (undated) DEVICE — CONN TBG Y 5 IN 1 LF STRL

## (undated) DEVICE — LAPAROSCOPIC SMOKE FILTRATION SYSTEM: Brand: PALL LAPAROSHIELD® PLUS LAPAROSCOPIC SMOKE FILTRATION SYSTEM

## (undated) DEVICE — TROCAR: Brand: KII OPTICAL ACCESS SYSTEM

## (undated) DEVICE — ESOPHAGEAL BALLOON DILATATION CATHETER: Brand: CRE FIXED WIRE

## (undated) DEVICE — GLV SURG SENSICARE PI MIC PF SZ8.5 LF STRL

## (undated) DEVICE — DEV INFL ALLIANCE2 SYS